# Patient Record
Sex: MALE | Race: WHITE | NOT HISPANIC OR LATINO | Employment: OTHER | URBAN - METROPOLITAN AREA
[De-identification: names, ages, dates, MRNs, and addresses within clinical notes are randomized per-mention and may not be internally consistent; named-entity substitution may affect disease eponyms.]

---

## 2023-04-21 ENCOUNTER — TELEPHONE (OUTPATIENT)
Dept: UROLOGY | Facility: CLINIC | Age: 83
End: 2023-04-21

## 2023-04-21 ENCOUNTER — OFFICE VISIT (OUTPATIENT)
Dept: UROLOGY | Facility: CLINIC | Age: 83
End: 2023-04-21
Payer: MEDICARE

## 2023-04-21 VITALS
HEIGHT: 71 IN | DIASTOLIC BLOOD PRESSURE: 76 MMHG | BODY MASS INDEX: 28.56 KG/M2 | OXYGEN SATURATION: 98 % | HEART RATE: 68 BPM | WEIGHT: 204 LBS | SYSTOLIC BLOOD PRESSURE: 132 MMHG

## 2023-04-21 DIAGNOSIS — R97.21 BIOCHEMICALLY RECURRENT MALIGNANT NEOPLASM OF PROSTATE: ICD-10-CM

## 2023-04-21 DIAGNOSIS — C61 BIOCHEMICALLY RECURRENT MALIGNANT NEOPLASM OF PROSTATE: ICD-10-CM

## 2023-04-21 DIAGNOSIS — C61 PROSTATE CANCER: ICD-10-CM

## 2023-04-21 DIAGNOSIS — C61 RECURRENT PROSTATE CANCER: Primary | ICD-10-CM

## 2023-04-21 NOTE — TELEPHONE ENCOUNTER
I called patient and told him to obtain a repeat PSA prior to his next appointment in a few weeks, at any [facility] lab.  He stated understanding

## 2023-04-21 NOTE — PROGRESS NOTES
Prostate Cancer Office Visit      Patient Name: Phillip Bridges  : 1940   MRN: [id number]     Chief Complaint:  Prostate Cancer.   Chief Complaint   Patient presents with   • Prostate Cancer       Referring Provider: No ref. provider found     History of Present Illness: Phillip Bridges is a 82 y.o. male who presents today for a history of prostate cancer around , he is unsure what his original pathology was.     He was following with Dr Martin a Urologist in [place], [place] who has subsequently passed away. He completed 43 treatments of radiation at that time.  States his PSA remains very low until a few years ago.    Patient has been following with Dr. Lorenzo of [facility] in [facility], there is no records available.  Patient states he was started on Eligard injections and Casodex roughly 6 months ago.  He states he has had severe side effects related to androgen deprivation therapy including dizziness, hot flashes, fatigue, shortness of breath, leg swelling etc.    Patient states he remains on Casodex at this time.  States displeased with his previous urologic care and would like to transition care to [facility].    PSA in 2018 was 0.42.   PSA in  was 2.0  PSA in  was 2.28  PSA in 2022 was 3.90  PSA in Oct 2022 was 5.48.     Digital rectal examination today demonstrates a very flat, tiny prostate likely as result of atrophy after radiation with no obvious nodularity or concerns.    Subjective      Review of System: Review of Systems   Genitourinary: Negative for decreased urine volume, difficulty urinating, dysuria, enuresis, flank pain, frequency, hematuria and urgency.      I have reviewed the ROS documented by my clinical staff, I updated appropriately and I agree. Los Scales MD    Past Medical History: History reviewed. No pertinent past medical history.    Past Surgical History: History reviewed. No pertinent surgical history.    Family History: History  reviewed. No pertinent family history.    Social History:   Social History     Socioeconomic History   • Marital status:    Tobacco Use   • Smoking status: Former     Years: 45.00     Types: Cigarettes   • Smokeless tobacco: Never   Vaping Use   • Vaping Use: Never used   Substance and Sexual Activity   • Alcohol use: Not Currently   • Drug use: Never   • Sexual activity: Not Currently       Medications:   No current outpatient medications on file.    Allergies:   No Known Allergies    IPSS Questionnaire (AUA-7):  Over the past month…    1)  Incomplete Emptying:       How often have you had a sensation of not emptying you had the sensation of not emptying your bladder completely after you finished urinating?  0 - Not at all   2)  Frequency:       How often have you had the urinate again less than two hours after you finished urinating?  1 - Less than 1 time in 5   3)  Intermittency:       How often have you found you stopped and started again several times when you urinated?   0 - Not at all   4) Urgency:      How often have you found it difficult to postpone urination?  0 - Not at all   5) Weak Stream:      How often have you had a weak urinary stream?  1 - Less than 1 time in 5   6) Straining:       How often have you had to push or strain to begin urination?  0 - Not at all   7) Nocturia:      How many times did you most typically get up to urinate from the time you went to bed at night until the time you got up in the morning?  3 - 3 times   Total Score:  5   The International Prostate Symptom Score (IPSS) is used to screen, diagnose, track symptoms of benign prostatic hyperplasia (BPH).   0-7 (Mild Symptoms) 8-19 (Moderate) 20-35 (Severe)   Quality of Life (QoL):  If you were to spend the rest of your life with your urinary condition just the way it is now, how would you feel about that? 0-Delighted   Urine Leakage (Incontinence) 0-No Leakage       Sexual Health Inventory for Men (ALBAN)   Over the past 6  "months:     1. How do you rate your confidence that you could get and keep an erection?  0 - No sexual activity    2. When you had erections with sexual   stimulation, how often were your erections hard enough for penetration (entering your partner)?  0 - No Sexual Activity    3. During sexual intercourse, how often were you able to maintain your erection after you had penetrated (entered) your partner?  0 - Did not attempt intercourse   4. During sexual intercourse, how difficult was it to maintain your erection to completion of intercourse?  0 - Did not attempt intercourse   5. When you attempted sexual intercourse, how often was it satisfactory for you?  0 - Did not attempt intercourse    Total Score: 0   The Sexual Health Inventory for Men further classifies ED severity with the following breakpoints:   1-7 (Severe ED) 8-11 (Moderate ED) 12-16 (Mild to Moderate ED) 17-21 (Mild ED)      Post void residual bladder scan:   0 mL     Objective     Physical Exam:   Vital Signs:   Vitals:    04/21/23 0826   BP: 132/76   Pulse: 68   SpO2: 98%   Weight: 92.5 kg (204 lb)   Height: 180.3 cm (71\")     Body mass index is 28.45 kg/m².     Physical Exam  Vitals and nursing note reviewed.   Constitutional:       Appearance: Normal appearance.   HENT:      Head: Normocephalic and atraumatic.      Nose: Nose normal.      Mouth/Throat:      Mouth: Mucous membranes are moist.      Pharynx: Oropharynx is clear.   Eyes:      Extraocular Movements: Extraocular movements intact.      Conjunctiva/sclera: Conjunctivae normal.      Pupils: Pupils are equal, round, and reactive to light.   Cardiovascular:      Rate and Rhythm: Normal rate and regular rhythm.   Pulmonary:      Effort: Pulmonary effort is normal. No respiratory distress.   Abdominal:      Palpations: Abdomen is soft.      Tenderness: There is no abdominal tenderness. There is no right CVA tenderness or left CVA tenderness.   Genitourinary:     Comments:  Circumcised phallus, " orthotopic meatus, bilaterally descended testicles without masses, or lesions.     PHUONG: Normal rectal tone, no blood, estimated 15 gram prostate which is flat and atrophied without nodularity or firmness.   Musculoskeletal:         General: Normal range of motion.      Cervical back: Normal range of motion and neck supple.   Skin:     General: Skin is warm and dry.      Findings: No lesion or rash.   Neurological:      General: No focal deficit present.      Mental Status: He is alert and oriented to person, place, and time. Mental status is at baseline.   Psychiatric:         Mood and Affect: Mood normal.         Behavior: Behavior normal.         Labs:   No results found for: PSA    No results found for: WBC, HGB, HCT, MCV, PLT    Lab Results   Component Value Date    BUN 18 05/24/2022    CREATININE 0.91 05/24/2022    EGFRIFNONA >60 05/24/2022    EGFRIFAFRI 76 09/30/2022    BCR 20 05/24/2022    K 4.1 05/24/2022    CO2 20 (L) 05/24/2022    CALCIUM 8.8 (L) 05/24/2022       Images:   No Images in the past 120 days found..    Measures:   Tobacco:   Phillip Bridges  reports that he has quit smoking. His smoking use included cigarettes. He has never used smokeless tobacco.    Assessment / Plan      Assessment:   Mr. Bridges is a 82 y.o. male who presented today with recurrent prostate cancer, patient was treated with primary radiation therapy in 2008, no pathology records are available.  His previous urologist has subacutely passed away many years ago.  Patient has been biochemical recurrence and we discussed AUA and NCCN guidelines for work-up.  He remains on Casodex, completed 6 months of Eligard recently.  Has also been on Prolia for bone prevention.  I will asked the patient to complete a PSMA PET scan for staging prior to consideration of neck steps.  We will need him to complete another PSA soon.  We will have him set up with oncology for further multidisciplinary discussion regarding best options, in the absence of  obvious metastatic disease on PET scan his options remain monitoring off ADT or intermittent ADT in light of rapidly rising PSA.      Diagnoses and all orders for this visit:    1. Recurrent prostate cancer (Primary)  -     NM PET/CT Skull Base to Mid Thigh; Future  -     Ambulatory Referral to Oncology    2. Prostate cancer  -     NM PET/CT Skull Base to Mid Thigh; Future  -     Ambulatory Referral to Oncology    3. Biochemically recurrent malignant neoplasm of prostate  -     NM PET/CT Skull Base to Mid Thigh; Future  -     Ambulatory Referral to Oncology  -     PSA Diagnostic; Future           Follow Up:   Return in about 3 weeks (around 5/12/2023).    I spent approximately 45 minutes providing clinical care for this patient; including review of patient's chart and provider documentation, face to face time spent with patient in examination room (obtaining history, performing physical exam, discussing diagnosis and management options), placing orders, and completing patient documentation.     Los Scales MD  [facility]

## 2023-04-21 NOTE — TELEPHONE ENCOUNTER
Patient wanted to remind doctor to prescribe Casodex and call in to [facility] in [place], [place].  Thank you.

## 2023-04-24 DIAGNOSIS — C61 BIOCHEMICALLY RECURRENT CASTRATION-SENSITIVE ADENOCARCINOMA OF PROSTATE: Primary | ICD-10-CM

## 2023-04-24 DIAGNOSIS — Z19.1 BIOCHEMICALLY RECURRENT CASTRATION-SENSITIVE ADENOCARCINOMA OF PROSTATE: Primary | ICD-10-CM

## 2023-04-24 RX ORDER — BICALUTAMIDE 50 MG/1
50 TABLET, FILM COATED ORAL DAILY
Qty: 60 TABLET | Refills: 2 | Status: SHIPPED | OUTPATIENT
Start: 2023-04-24

## 2023-04-24 NOTE — TELEPHONE ENCOUNTER
Patient called again to check on Casodex being sent to his pharmacy. I didn't see it in his chart.

## 2023-05-08 ENCOUNTER — LAB (OUTPATIENT)
Dept: LAB | Facility: HOSPITAL | Age: 83
End: 2023-05-08
Payer: MEDICARE

## 2023-05-08 DIAGNOSIS — R97.21 BIOCHEMICALLY RECURRENT MALIGNANT NEOPLASM OF PROSTATE: ICD-10-CM

## 2023-05-08 DIAGNOSIS — C61 BIOCHEMICALLY RECURRENT MALIGNANT NEOPLASM OF PROSTATE: ICD-10-CM

## 2023-05-08 PROCEDURE — 36415 COLL VENOUS BLD VENIPUNCTURE: CPT

## 2023-05-08 PROCEDURE — 84153 ASSAY OF PSA TOTAL: CPT

## 2023-05-09 LAB — PSA SERPL-MCNC: <0.014 NG/ML (ref 0–4)

## 2023-05-12 ENCOUNTER — TELEPHONE (OUTPATIENT)
Dept: UROLOGY | Facility: CLINIC | Age: 83
End: 2023-05-12
Payer: MEDICARE

## 2023-05-12 ENCOUNTER — HOSPITAL ENCOUNTER (OUTPATIENT)
Dept: PET IMAGING | Facility: HOSPITAL | Age: 83
Discharge: HOME | End: 2023-05-12
Payer: MEDICARE

## 2023-05-12 DIAGNOSIS — C61 BIOCHEMICALLY RECURRENT MALIGNANT NEOPLASM OF PROSTATE: ICD-10-CM

## 2023-05-12 DIAGNOSIS — C61 PROSTATE CANCER: ICD-10-CM

## 2023-05-12 DIAGNOSIS — C61 RECURRENT PROSTATE CANCER: ICD-10-CM

## 2023-05-12 DIAGNOSIS — R97.21 BIOCHEMICALLY RECURRENT MALIGNANT NEOPLASM OF PROSTATE: ICD-10-CM

## 2023-05-12 PROCEDURE — 0 PIFLUFOLASTAT F 18 9 MCI SOLUTION PREFILLED SYRINGE: Performed by: STUDENT IN AN ORGANIZED HEALTH CARE EDUCATION/TRAINING PROGRAM

## 2023-05-12 PROCEDURE — A9595 PIFLUFOLASTAT F 18 9 MCI SOLUTION PREFILLED SYRINGE: HCPCS | Performed by: STUDENT IN AN ORGANIZED HEALTH CARE EDUCATION/TRAINING PROGRAM

## 2023-05-12 PROCEDURE — 78815 PET IMAGE W/CT SKULL-THIGH: CPT

## 2023-05-12 RX ADMIN — PIFLUFOLASTAT F-18 1 DOSE: 80 INJECTION INTRAVENOUS at 13:31

## 2023-05-15 ENCOUNTER — CONSULT (OUTPATIENT)
Dept: ONCOLOGY | Facility: CLINIC | Age: 83
End: 2023-05-15
Payer: MEDICARE

## 2023-05-15 VITALS
HEART RATE: 75 BPM | SYSTOLIC BLOOD PRESSURE: 141 MMHG | HEIGHT: 71 IN | TEMPERATURE: 97.8 F | DIASTOLIC BLOOD PRESSURE: 64 MMHG | WEIGHT: 205.9 LBS | BODY MASS INDEX: 28.82 KG/M2 | OXYGEN SATURATION: 97 %

## 2023-05-15 DIAGNOSIS — C61 PROSTATE CANCER: Primary | ICD-10-CM

## 2023-05-15 PROCEDURE — 1126F AMNT PAIN NOTED NONE PRSNT: CPT | Performed by: INTERNAL MEDICINE

## 2023-05-15 PROCEDURE — 99204 OFFICE O/P NEW MOD 45 MIN: CPT | Performed by: INTERNAL MEDICINE

## 2023-05-15 RX ORDER — LEVOCETIRIZINE DIHYDROCHLORIDE 5 MG/1
TABLET, FILM COATED ORAL
COMMUNITY

## 2023-05-15 RX ORDER — LEVOTHYROXINE SODIUM 75 UG/1
75 CAPSULE ORAL
COMMUNITY

## 2023-05-15 RX ORDER — BUPROPION HYDROCHLORIDE 150 MG/1
TABLET ORAL
COMMUNITY

## 2023-05-15 RX ORDER — TAMSULOSIN HYDROCHLORIDE 0.4 MG/1
CAPSULE ORAL
COMMUNITY

## 2023-05-15 RX ORDER — AMLODIPINE BESYLATE 5 MG/1
TABLET ORAL
COMMUNITY
Start: 2023-02-22

## 2023-05-15 NOTE — PROGRESS NOTES
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 male past medical history of seasonal allergies, hypothyroidism, hypertension, prostate cancer who presents today for evaluation of prostate cancer. The patient is accompanied by their wife who contributes to the history of their care.  Patient was initially diagnosed in  and underwent definitive radiation therapy.  Pathology results not available to me from his initial diagnosis.  He was on observation with normal PSAs until  when his PSA started to slowly creep up over the past 6 years until eventually reaching a range of 5.48 in 2022.  He was started on Eligard as well as Casodex.  Had significant side effects with Eligard in terms of fatigue, hot flashes, swelling, emotional changes.  He was seen by Dr. Scales who ordered a PSA and a PSMA PET/CT.  PSA in May 2023 was <0.014 and his PSMA PET/CT did not have any disease noted.  He is currently doing well with no major toxicities.    Oncology History:    Oncology/Hematology History    No history exists.       Subjective      Review of Systems:     Constitutional: Negative for fevers, chills, or weight loss  Eyes: Negative for blurred vision or discharge         Ear/Nose/Throat: Negative for difficulty swallowing, sore throat, LAD                                                       Respiratory: Negative for cough, SOA, wheezing                                                                                        Cardiovascular: Negative for chest pain or palpitations                                                                  Gastrointestinal: Negative for nausea, vomiting or diarrhea                                                                      Genitourinary: Negative for dysuria or hematuria                                                                                           Musculoskeletal: Negative for any joint pains or muscle aches                                                                        Neurologic: Negative for any weakness, headaches, dizziness                                                                         Hematologic: Negative for any easy bleeding or bruising                                                                                   Psychiatric: Negative for anxiety or depression                             Past Medical History: No past medical history on file.    Past Surgical History: No past surgical history on file.    Family History: No family history on file.    Social History:   Social History     Socioeconomic History   • Marital status:    Tobacco Use   • Smoking status: Former     Years: 45.00     Types: Cigarettes   • Smokeless tobacco: Never   Vaping Use   • Vaping Use: Never used   Substance and Sexual Activity   • Alcohol use: Not Currently   • Drug use: Never   • Sexual activity: Not Currently       Medications:     Current Outpatient Medications:   •  amLODIPine (NORVASC) 5 MG tablet, , Disp: , Rfl:   •  buPROPion XL (WELLBUTRIN XL) 150 MG 24 hr tablet, bupropion HCl  mg 24 hr tablet, extended release, Disp: , Rfl:   •  levocetirizine (XYZAL) 5 MG tablet, levocetirizine 5 mg tablet, Disp: , Rfl:   •  levothyroxine sodium (TIROSINT) 75 MCG capsule, Take 1 capsule by mouth., Disp: , Rfl:   •  Olmesartan Medoxomil (BENICAR PO), Benicar  10 mg, Disp: , Rfl:   •  tamsulosin (FLOMAX) 0.4 MG capsule 24 hr capsule, tamsulosin 0.4 mg capsule, Disp: , Rfl:     Allergies:   No Known Allergies    Objective     Physical Exam:  Vital Signs:   Vitals:    05/15/23 1041   BP: 141/64   Pulse: 75   Temp: 97.8 °F (36.6 °C)   TempSrc: Temporal   SpO2: 97%   Weight: 93.4 kg (205 lb 14.4 oz)   Height:  "180.3 cm (70.98\")   PainSc: 0-No pain     Pain Score    05/15/23 1041   PainSc: 0-No pain     ECOG Performance Status: 0 - Asymptomatic    Constitutional: NAD, ECOG 0  Eyes: PERRLA, scleral anicteric  ENT: No LAD, no thyromegaly  Respiratory: CTAB, no wheezing, rales, rhonchi  Cardiovascular: RRR, no murmurs, pulses 2+ bilaterally  Abdomen: soft, NT/ND, no HSM  Musculoskeletal: strength 5/5 bilaterally, no c/c/e  Neurologic: A&O x 3, CN II-XII intact grossly  Psych: mood and affect congruent, no SI or HI    Results Review:   Lab on 05/08/2023   Component Date Value Ref Range Status   • PSA 05/08/2023 <0.014  0.000 - 4.000 ng/mL Final       NM PET/CT Skull Base to Mid Thigh    Result Date: 5/13/2023  Narrative: NM PET/CT SKULL BASE TO MID THIGH Date of Exam: 5/12/2023 1:20 PM EDT Indication: recurrent prostate cancer after radiation therapy in 2008, PSA now 6+. Comparison: None available. TECHNIQUE: Radiopharmaceutical 9.03 mCi F18 Piflufolastat in right wrist @ 13:31 with patient imaged after appropriate amount of time. CT datasets performed for fusion analysis alone and should not be used for diagnostic purposes as these are low-dose protocol.  COMPARISON: None Findings: Physiologic tracer activity is present in the head and neck, with symmetric lacrimal and salivary gland activity present. No pathologic uptake is present. In the chest, physiologic blood pool activity is noted, without evidence of abnormal uptake in the lungs, soft tissues or osseous structures concerning for metastatic disease. In the abdomen and pelvis, typical exuberant physiologic hepatic, renal cortical and scattered GI uptake is present in addition to typical mild physiologic splenic activity. There is no evidence of abnormal uptake within the osseous structures or soft tissues concerning for metastatic disease. There is no distinct pathologic retroperitoneal adenopathy or pelvic nodularity. The prostate gland demonstrates no definite focal " uptake above low-grade background activity, maximum SUV 1.8.     Impression: Negative Pylarify PET scan without evidence of recurrent or metastatic disease. Electronically Signed: Gold Coreas  5/13/2023 6:06 AM EDT  Workstation ID: [id number]      Assessment / Plan      Assessment/Plan:   1. Prostate cancer (Primary)  -Initially diagnosed in 2008 and status post definitive radiation treatment  -PSA slowly rising from 2007 from 0.35 until October 20 22-5.48  -Castrate sensitive disease and likely low risk disease based off the amount of time for his PSA to increase  -Started on Eligard and Casodex in October 2022 with significant toxicities related to the Eligard  -PSA in May 2022 <0.014 and PSMA PET/CT with no disease noted  -Discussed the results with patient and wife today  -Discussed treatment options including active surveillance vs transitioning to relugolix for androgen deprivation  -Per discussion, we had elected to pursue active surveillance and plan to repeat a PSA in 3 months.  Ordered today  -     PSA DIAGNOSTIC; Future           Follow Up:   Follow-up in 3 months     Stanford Fonseca MD  Hematology and Oncology     Please note that portions of this note may have been completed with a voice recognition program. Efforts were made to edit the dictations, but occasionally words are mistranscribed.

## 2023-05-24 ENCOUNTER — OFFICE VISIT (OUTPATIENT)
Dept: UROLOGY | Facility: CLINIC | Age: 83
End: 2023-05-24
Payer: MEDICARE

## 2023-05-24 VITALS — WEIGHT: 205 LBS | BODY MASS INDEX: 28.7 KG/M2 | HEIGHT: 71 IN

## 2023-05-24 DIAGNOSIS — C61 RECURRENT PROSTATE CANCER: Primary | ICD-10-CM

## 2023-05-24 LAB
BILIRUB BLD-MCNC: NEGATIVE MG/DL
CLARITY, POC: CLEAR
COLOR UR: YELLOW
EXPIRATION DATE: NORMAL
GLUCOSE UR STRIP-MCNC: NEGATIVE MG/DL
KETONES UR QL: NEGATIVE
LEUKOCYTE EST, POC: NEGATIVE
Lab: NORMAL
NITRITE UR-MCNC: NEGATIVE MG/ML
PH UR: 6 [PH] (ref 5–8)
PROT UR STRIP-MCNC: NEGATIVE MG/DL
RBC # UR STRIP: NEGATIVE /UL
SP GR UR: 1.01 (ref 1–1.03)
UROBILINOGEN UR QL: NORMAL

## 2023-05-24 PROCEDURE — 51798 US URINE CAPACITY MEASURE: CPT | Performed by: STUDENT IN AN ORGANIZED HEALTH CARE EDUCATION/TRAINING PROGRAM

## 2023-05-24 PROCEDURE — 99213 OFFICE O/P EST LOW 20 MIN: CPT | Performed by: STUDENT IN AN ORGANIZED HEALTH CARE EDUCATION/TRAINING PROGRAM

## 2023-05-24 PROCEDURE — 81003 URINALYSIS AUTO W/O SCOPE: CPT | Performed by: STUDENT IN AN ORGANIZED HEALTH CARE EDUCATION/TRAINING PROGRAM

## 2023-05-24 PROCEDURE — 1160F RVW MEDS BY RX/DR IN RCRD: CPT | Performed by: STUDENT IN AN ORGANIZED HEALTH CARE EDUCATION/TRAINING PROGRAM

## 2023-05-24 PROCEDURE — 1159F MED LIST DOCD IN RCRD: CPT | Performed by: STUDENT IN AN ORGANIZED HEALTH CARE EDUCATION/TRAINING PROGRAM

## 2023-05-24 RX ORDER — LEVOTHYROXINE SODIUM 0.07 MG/1
TABLET ORAL
COMMUNITY
Start: 2023-05-15

## 2023-05-24 RX ORDER — ESZOPICLONE 1 MG/1
TABLET, FILM COATED ORAL
COMMUNITY
Start: 2023-05-17

## 2023-05-24 RX ORDER — FLUTICASONE PROPIONATE 50 MCG
SPRAY, SUSPENSION (ML) NASAL
COMMUNITY
Start: 2023-05-22

## 2023-05-24 NOTE — PROGRESS NOTES
Follow Up Office Visit      Patient Name: Phillip Bridges  : 1940   MRN: [id number]     Chief Complaint:    Chief Complaint   Patient presents with   • Recurrent prostate cancer     Follow up   • Biochemically recurrent malignant neoplasm of prostate   0.1mL    Referring Provider: No ref. provider found    History of Present Illness: Phillip Bridges is a 82 y.o. male who presents today for follow up of biochemically recurrent prostate cancer.  History of prostate cancer, unclear pathology given no outside records are available, completed 43 treatments of radiation with an outside urologist in [facility] who has since passed away.  Has been managed with Eligard and Casodex for presumed biochemical recurrence.  PSA trend is below.  Last underwent Eligard injection in 2022.  Had been on Casodex as well.     PSA in  was 0.42.   PSA in  was 2.0  PSA in  was 2.28  PSA in 2022 was 3.90  PSA in Oct 2022 was 5.48.     Lab Results   Component Value Date    PSA <0.014 2023       He was originally seen in my clinic in .  Given biochemical recurrence he completed a PSMA PET scan which resulted negative for obvious positive PET lesions.    He was referred to medical oncology Dr. Fonseca who is recommending active surveillance off of androgen deprivation therapy given negative PSMA PET scan findings.    The patient states he has intermittent hot flashes, bilateral peripheral neuropathy, some memory changes after undergoing ADT.  He is hesitant to consider ADT again given the side effects.    Subjective      Review of System: Review of Systems   Constitutional: Negative for activity change, fatigue and unexpected weight change.      I have reviewed the ROS documented by my clinical staff, I have updated appropriately and I agree. Los Scales MD    I have reviewed and the following portions of the patient's history were updated as appropriate: past family history, past medical  history, past social history, past surgical history and problem list.    Medications:     Current Outpatient Medications:   •  amLODIPine (NORVASC) 5 MG tablet, , Disp: , Rfl:   •  buPROPion XL (WELLBUTRIN XL) 150 MG 24 hr tablet, bupropion HCl  mg 24 hr tablet, extended release, Disp: , Rfl:   •  eszopiclone (LUNESTA) 1 MG tablet, , Disp: , Rfl:   •  fluticasone (FLONASE) 50 MCG/ACT nasal spray, , Disp: , Rfl:   •  levocetirizine (XYZAL) 5 MG tablet, levocetirizine 5 mg tablet, Disp: , Rfl:   •  levothyroxine (SYNTHROID, LEVOTHROID) 75 MCG tablet, , Disp: , Rfl:   •  levothyroxine sodium (TIROSINT) 75 MCG capsule, Take 1 capsule by mouth., Disp: , Rfl:   •  Olmesartan Medoxomil (BENICAR PO), Benicar  10 mg, Disp: , Rfl:   •  tamsulosin (FLOMAX) 0.4 MG capsule 24 hr capsule, tamsulosin 0.4 mg capsule, Disp: , Rfl:     Allergies:   No Known Allergies    IPSS Questionnaire (AUA-7):  Over the past month…    1)  Incomplete Emptying:       How often have you had a sensation of not emptying you had the sensation of not emptying your bladder completely after you finished urinating?  2 - Less than half the time   2)  Frequency:       How often have you had the urinate again less than two hours after you finished urinating?  2 - Less than half the time   3)  Intermittency:       How often have you found you stopped and started again several times when you urinated?   2 - Less than half the time   4) Urgency:      How often have you found it difficult to postpone urination?  1 - Less than 1 time in 5   5) Weak Stream:      How often have you had a weak urinary stream?  2 - Less than half the time   6) Straining:       How often have you had to push or strain to begin urination?  2 - Less than half the time   7) Nocturia:      How many times did you most typically get up to urinate from the time you went to bed at night until the time you got up in the morning?  3 - 3 times   Total Score:  14   The International Prostate  "Symptom Score (IPSS) is used to screen, diagnose, track symptoms of benign prostatic hyperplasia (BPH).   0-7 (Mild Symptoms) 8-19 (Moderate) 20-35 (Severe)   Quality of Life (QoL):  If you were to spend the rest of your life with your urinary condition just the way it is now, how would you feel about that? 1-Pleased   Urine Leakage (Incontinence) 0-No Leakage     Sexual Health Inventory for Men (ALBAN)   Over the past 6 months:     1. How do you rate your confidence that you could get and keep an erection?  0 - No sexual activity    2. When you had erections with sexual  stimulation, how often were your erections hard enough for penetration (entering your partner)?  0 - No Sexual Activity    3. During sexual intercourse, how often were you able to maintain your erection after you had penetrated (entered) your partner?  0 - Did not attempt intercourse   4. During sexual intercourse, how difficult was it to maintain your erection to completion of intercourse?  0 - Did not attempt intercourse   5. When you attempted sexual intercourse, how often was it satisfactory for you?  0 - Did not attempt intercourse    Total Score: 0   The Sexual Health Inventory for Men further classifies ED severity with the following breakpoints:   1-7 (Severe ED) 8-11 (Moderate ED) 12-16 (Mild to Moderate ED) 17-21 (Mild ED)           Objective     Physical Exam:   Vital Signs:   Vitals:    05/24/23 1031   Weight: 93 kg (205 lb)   Height: 180.3 cm (70.98\")   PainSc: 0-No pain     Body mass index is 28.6 kg/m².     Physical Exam  Constitutional:       Appearance: Normal appearance.   HENT:      Head: Normocephalic and atraumatic.      Nose: Nose normal.   Eyes:      Extraocular Movements: Extraocular movements intact.      Conjunctiva/sclera: Conjunctivae normal.      Pupils: Pupils are equal, round, and reactive to light.   Musculoskeletal:         General: Normal range of motion.      Cervical back: Normal range of motion and neck supple. "   Skin:     General: Skin is warm and dry.      Findings: No lesion or rash.   Neurological:      General: No focal deficit present.      Mental Status: He is alert and oriented to person, place, and time. Mental status is at baseline.   Psychiatric:         Mood and Affect: Mood normal.         Behavior: Behavior normal.         Labs:   Brief Urine Lab Results  (Last result in the past 365 days)      Color   Clarity   Blood   Leuk Est   Nitrite   Protein   CREAT   Urine HCG        05/24/23 1056 Yellow   Clear   Negative   Negative   Negative   Negative                      Lab Results   Component Value Date    CALCIUM 8.8 (L) 05/24/2022     05/24/2022    K 4.1 05/24/2022    CO2 20 (L) 05/24/2022     05/24/2022    BUN 18 05/24/2022    CREATININE 0.91 05/24/2022    EGFRIFAFRI 76 09/30/2022    EGFRIFNONA >60 05/24/2022    BCR 20 05/24/2022    ANIONGAP 14 05/24/2022       No results found for: WBC, HGB, HCT, MCV, PLT    Images:   NM PET/CT Skull Base to Mid Thigh    Result Date: 5/13/2023  Negative Pylarify PET scan without evidence of recurrent or metastatic disease. Electronically Signed: Gold Coreas  5/13/2023 6:06 AM EDT  Workstation ID: [id number]      Measures:   Tobacco:   Phillip Bridges  reports that he has quit smoking. His smoking use included cigarettes. He has never used smokeless tobacco.              Assessment / Plan      Assessment/Plan:   82 y.o. male who presented today for follow up of biochemically recurrent prostate cancer after remote radiation.  Most recent PSA undetectable after a 6-month Eligard injection and ongoing Casodex which was recently discontinued.  PSMA PET scan is negative for recurrent positive PET lesions and given his age and poor tolerance of ADT Dr. Fonseca has recommended active surveillance and repeating a PSA in 3 months which is reasonable.  Discussed at this point given negative PET scan it would be reasonable to stay off ADT and less significant PSA rise in the  future at which point repeat imaging can be considered or going back on ADT of some sort.  Oral relugolix may be better tolerated by this patient.  I will defer primary treatment to medical oncology and I will see him back in 6 months for symptom check.    Diagnoses and all orders for this visit:    1. Recurrent prostate cancer (Primary)  -     POC Urinalysis Dipstick, Automated           Follow Up:   Return in about 6 months (around 11/24/2023).    I spent approximately 20 minutes providing clinical care for this patient; including review of patient's chart and provider documentation, face to face time spent with patient in examination room (obtaining history, performing physical exam, discussing diagnosis and management options), placing orders, and completing patient documentation.     Los Scales MD  [facility] [place]

## 2023-08-14 ENCOUNTER — TELEPHONE (OUTPATIENT)
Dept: ONCOLOGY | Facility: CLINIC | Age: 83
End: 2023-08-14

## 2023-08-14 ENCOUNTER — LAB (OUTPATIENT)
Dept: LAB | Facility: HOSPITAL | Age: 83
End: 2023-08-14
Payer: MEDICARE

## 2023-08-14 DIAGNOSIS — C61 PROSTATE CANCER: ICD-10-CM

## 2023-08-14 LAB — PSA SERPL-MCNC: <0.014 NG/ML (ref 0–4)

## 2023-08-14 PROCEDURE — 84403 ASSAY OF TOTAL TESTOSTERONE: CPT

## 2023-08-14 PROCEDURE — 84153 ASSAY OF PSA TOTAL: CPT

## 2023-08-14 PROCEDURE — 36415 COLL VENOUS BLD VENIPUNCTURE: CPT

## 2023-08-14 PROCEDURE — 84402 ASSAY OF FREE TESTOSTERONE: CPT

## 2023-08-14 NOTE — TELEPHONE ENCOUNTER
Caller: Phillip Bridges    Relationship to patient: Self    Best call back number: [phone]    Chief complaint: PATIENT CALLED TO RESCHEDULE    Type of visit: FOLLOW UP    If rescheduling, when is the original appointment: 8-14-23

## 2023-08-14 NOTE — TELEPHONE ENCOUNTER
Caller: Phillip Bridges    Relationship to patient: Self    Best call back number: [phone]    Chief complaint: IN TRAFFIC CAN'T MAKE IT     Type of visit: F/U 1     Requested date: CALL TO R/S     If rescheduling, when is the original appointment: 8/14/2023

## 2023-08-15 ENCOUNTER — OFFICE VISIT (OUTPATIENT)
Dept: ONCOLOGY | Facility: CLINIC | Age: 83
End: 2023-08-15
Payer: MEDICARE

## 2023-08-15 VITALS
HEIGHT: 71 IN | OXYGEN SATURATION: 98 % | SYSTOLIC BLOOD PRESSURE: 138 MMHG | HEART RATE: 79 BPM | TEMPERATURE: 97 F | RESPIRATION RATE: 16 BRPM | WEIGHT: 205 LBS | BODY MASS INDEX: 28.7 KG/M2 | DIASTOLIC BLOOD PRESSURE: 64 MMHG

## 2023-08-15 DIAGNOSIS — C61 PROSTATE CANCER: Primary | ICD-10-CM

## 2023-08-15 PROCEDURE — 99214 OFFICE O/P EST MOD 30 MIN: CPT | Performed by: INTERNAL MEDICINE

## 2023-08-15 PROCEDURE — 1126F AMNT PAIN NOTED NONE PRSNT: CPT | Performed by: INTERNAL MEDICINE

## 2023-08-15 RX ORDER — ERYTHROMYCIN 5 MG/G
1 OINTMENT OPHTHALMIC NIGHTLY
COMMUNITY
Start: 2023-07-18

## 2023-08-15 RX ORDER — FLUTICASONE PROPIONATE 44 MCG
1 AEROSOL WITH ADAPTER (GRAM) INHALATION
COMMUNITY
Start: 2023-07-11

## 2023-08-15 RX ORDER — ALLOPURINOL 300 MG/1
300 TABLET ORAL DAILY
COMMUNITY
Start: 2023-06-12

## 2023-08-15 NOTE — PROGRESS NOTES
Follow Up Office Visit      Date: 08/15/2023     Patient Name: Phillpi Bridges  MRN: [id number]  : 1940  Referring Physician: Los Scales     Chief Complaint: Follow-up for castrate sensitive prostate cancer     History of Present Illness: Phillip Bridges is a pleasant 82 y.o. male past medical history of seasonal allergies, hypothyroidism, hypertension, prostate cancer who presents today for evaluation of prostate cancer. The patient is accompanied by their wife who contributes to the history of their care.  Patient was initially diagnosed in  and underwent definitive radiation therapy.  Pathology results not available to me from his initial diagnosis.  He was on observation with normal PSAs until  when his PSA started to slowly creep up over the past 6 years until eventually reaching a range of 5.48 in 2022.  He was started on Eligard as well as Casodex.  Had significant side effects with Eligard in terms of fatigue, hot flashes, swelling, emotional changes.  He was seen by Dr. Scales who ordered a PSA and a PSMA PET/CT.  PSA in May 2023 was <0.014 and his PSMA PET/CT did not have any disease noted.  He is currently doing well with no major toxicities.    Interval History:  Presents clinic for follow-up.  Overall stable at this time.  Continues to have fatigue and some weakness which affects his quality of life.  Notes some mild hot flashes but overall stable.  Has some tingling in his lower extremities bilaterally but has been never diagnosed with neuropathy.  Feels like his symptoms are still related to his Eligard injection back in October    Oncology History:    Oncology/Hematology History    No history exists.       Subjective      Review of Systems:   Constitutional: Negative for fevers, chills, or weight loss  Eyes: Negative for blurred vision or discharge         Ear/Nose/Throat: Negative for difficulty swallowing, sore throat, LAD                                                        Respiratory: Negative for cough, SOA, wheezing                                                                                        Cardiovascular: Negative for chest pain or palpitations                                                                  Gastrointestinal: Negative for nausea, vomiting or diarrhea                                                                     Genitourinary: Negative for dysuria or hematuria                                                                                           Musculoskeletal: Negative for any joint pains or muscle aches                                                                        Neurologic: Negative for any weakness, headaches, dizziness                                                                         Hematologic: Negative for any easy bleeding or bruising                                                                                   Psychiatric: Negative for anxiety or depression                          Past Medical History/Past Surgical History/ Family History/ Social History: Reviewed by me and unchanged from my previous documentation done on May 2023.     Medications:     Current Outpatient Medications:     allopurinol (ZYLOPRIM) 300 MG tablet, Take 1 tablet by mouth Daily., Disp: , Rfl:     amLODIPine (NORVASC) 5 MG tablet, , Disp: , Rfl:     buPROPion XL (WELLBUTRIN XL) 150 MG 24 hr tablet, bupropion HCl  mg 24 hr tablet, extended release, Disp: , Rfl:     erythromycin (ROMYCIN) 5 MG/GM ophthalmic ointment, Administer 1 application  to both eyes Every Night., Disp: , Rfl:     eszopiclone (LUNESTA) 1 MG tablet, As Needed., Disp: , Rfl:     Flovent HFA 44 MCG/ACT inhaler, Inhale 1 puff 2 (Two) Times a Day., Disp: , Rfl:     fluticasone (FLONASE) 50 MCG/ACT nasal spray, 1 spray into the nostril(s) as directed by provider As Needed., Disp: , Rfl:     levothyroxine (SYNTHROID, LEVOTHROID) 75 MCG tablet, , Disp: , Rfl:      "Olmesartan Medoxomil (BENICAR PO), Take 20 mg by mouth Daily., Disp: , Rfl:     tamsulosin (FLOMAX) 0.4 MG capsule 24 hr capsule, tamsulosin 0.4 mg capsule, Disp: , Rfl:     Allergies:   No Known Allergies    Objective     Physical Exam:  Vital Signs:   Vitals:    08/15/23 1003   BP: 138/64   Pulse: 79   Resp: 16   Temp: 97 øF (36.1 øC)   SpO2: 98%   Weight: 93 kg (205 lb)   Height: 180.3 cm (71\")   PainSc: 0-No pain     Pain Score    08/15/23 1003   PainSc: 0-No pain     ECOG Performance Status: 0 - Asymptomatic    Constitutional: NAD, ECOG 0  Eyes: PERRLA, scleral anicteric  ENT: No LAD, no thyromegaly  Respiratory: CTAB, no wheezing, rales, rhonchi  Cardiovascular: RRR, no murmurs, pulses 2+ bilaterally  Abdomen: soft, NT/ND, no HSM  Musculoskeletal: strength 5/5 bilaterally, no c/c/e  Neurologic: A&O x 3, CN II-XII intact grossly    Results Review:   Lab on 08/14/2023   Component Date Value Ref Range Status    PSA 08/14/2023 <0.014  0.000 - 4.000 ng/mL Final       No results found.    Assessment / Plan      Assessment/Plan:   1. Prostate cancer (Primary)  -Initially diagnosed in 2008 and status post definitive radiation treatment  -PSA slowly rising from 2007 from 0.35 until October 20 22-5.48  -Castrate sensitive disease and likely low risk disease based off the amount of time for his PSA to increase  -Started on Eligard and Casodex in October 2022 with significant toxicities related to the Eligard  -PSA in May 2023 <0.014 and PSMA PET/CT with no disease noted  -PSA in August 2023 < 0.014  -We will continue with active surveillance at this time with a repeat PSA in 3 months  -Should he require treatment, will consider relugolix rather than Eligard    2.  Pituitary macroadenoma  -Follows with neurosurgery at   -Most recent MRI in June 2023 showing stable disease with plans to repeat in 1 year    Follow Up:   Follow-up in 3 months       Stanford Fonseca MD  Hematology and Oncology     Please note that " portions of this note may have been completed with a voice recognition program. Efforts were made to edit the dictations, but occasionally words are mistranscribed.

## 2023-08-20 LAB
TESTOST FREE SERPL-MCNC: 0.4 PG/ML (ref 6.6–18.1)
TESTOST SERPL-MCNC: <3 NG/DL (ref 264–916)

## 2023-11-12 ENCOUNTER — LAB (OUTPATIENT)
Dept: LAB | Facility: HOSPITAL | Age: 83
End: 2023-11-12
Payer: MEDICARE

## 2023-11-12 DIAGNOSIS — C61 PROSTATE CANCER: ICD-10-CM

## 2023-11-12 LAB — PSA SERPL-MCNC: <0.014 NG/ML (ref 0–4)

## 2023-11-12 PROCEDURE — 84153 ASSAY OF PSA TOTAL: CPT

## 2023-11-12 PROCEDURE — 36415 COLL VENOUS BLD VENIPUNCTURE: CPT

## 2023-11-21 ENCOUNTER — OFFICE VISIT (OUTPATIENT)
Dept: ONCOLOGY | Facility: CLINIC | Age: 83
End: 2023-11-21
Payer: MEDICARE

## 2023-11-21 VITALS
DIASTOLIC BLOOD PRESSURE: 70 MMHG | TEMPERATURE: 97.4 F | HEIGHT: 71 IN | WEIGHT: 205 LBS | SYSTOLIC BLOOD PRESSURE: 168 MMHG | OXYGEN SATURATION: 96 % | BODY MASS INDEX: 28.7 KG/M2 | RESPIRATION RATE: 18 BRPM | HEART RATE: 69 BPM

## 2023-11-21 DIAGNOSIS — C61 PROSTATE CANCER: Primary | ICD-10-CM

## 2023-11-21 PROCEDURE — 99214 OFFICE O/P EST MOD 30 MIN: CPT | Performed by: INTERNAL MEDICINE

## 2023-11-21 PROCEDURE — 1126F AMNT PAIN NOTED NONE PRSNT: CPT | Performed by: INTERNAL MEDICINE

## 2023-11-21 RX ORDER — CARVEDILOL 6.25 MG/1
6.25 TABLET ORAL 2 TIMES DAILY WITH MEALS
COMMUNITY
Start: 2023-10-04

## 2023-11-21 NOTE — PROGRESS NOTES
Follow Up Office Visit      Date: 2023     Patient Name: Phillip Bridges  MRN: [id number]  : 1940  Referring Physician: Los Scales     Chief Complaint: Follow-up for castrate sensitive prostate cancer     History of Present Illness: Phillip Bridges is a pleasant 82 y.o. male past medical history of seasonal allergies, hypothyroidism, hypertension, prostate cancer who presents today for evaluation of prostate cancer. The patient is accompanied by their wife who contributes to the history of their care.  Patient was initially diagnosed in  and underwent definitive radiation therapy.  Pathology results not available to me from his initial diagnosis.  He was on observation with normal PSAs until  when his PSA started to slowly creep up over the past 6 years until eventually reaching a range of 5.48 in 2022.  He was started on Eligard as well as Casodex.  Had significant side effects with Eligard in terms of fatigue, hot flashes, swelling, emotional changes.  He was seen by Dr. Scales who ordered a PSA and a PSMA PET/CT.  PSA in May 2023 was <0.014 and his PSMA PET/CT did not have any disease noted.  He is currently doing well with no major toxicities.     Interval History:  Presents clinic for follow-up.  No major issues today.  Denies any worsening fatigue or tiredness.  Following with cardiology at [facility] and recent had a heart cath which only revealed 1 vessel disease.  Not requiring any stents or other surgical intervention    Oncology History:    Oncology/Hematology History    No history exists.       Subjective      Review of Systems:   Constitutional: Negative for fevers, chills, or weight loss  Eyes: Negative for blurred vision or discharge         Ear/Nose/Throat: Negative for difficulty swallowing, sore throat, LAD                                                       Respiratory: Negative for cough, SOA, wheezing                                                                                         Cardiovascular: Negative for chest pain or palpitations                                                                  Gastrointestinal: Negative for nausea, vomiting or diarrhea                                                                     Genitourinary: Negative for dysuria or hematuria                                                                                           Musculoskeletal: Negative for any joint pains or muscle aches                                                                        Neurologic: Negative for any weakness, headaches, dizziness                                                                         Hematologic: Negative for any easy bleeding or bruising                                                                                   Psychiatric: Negative for anxiety or depression                          Past Medical History/Past Surgical History/ Family History/ Social History: Reviewed by me and unchanged from my previous documentation done on August 2023.     Medications:     Current Outpatient Medications:     allopurinol (ZYLOPRIM) 300 MG tablet, Take 1 tablet by mouth Daily., Disp: , Rfl:     amLODIPine (NORVASC) 5 MG tablet, , Disp: , Rfl:     buPROPion XL (WELLBUTRIN XL) 150 MG 24 hr tablet, bupropion HCl  mg 24 hr tablet, extended release, Disp: , Rfl:     carvedilol (COREG) 6.25 MG tablet, Take 1 tablet by mouth 2 (Two) Times a Day With Meals., Disp: , Rfl:     erythromycin (ROMYCIN) 5 MG/GM ophthalmic ointment, Administer 1 application  to both eyes Every Night., Disp: , Rfl:     eszopiclone (LUNESTA) 1 MG tablet, As Needed., Disp: , Rfl:     Flovent HFA 44 MCG/ACT inhaler, Inhale 1 puff 2 (Two) Times a Day., Disp: , Rfl:     fluticasone (FLONASE) 50 MCG/ACT nasal spray, 1 spray into the nostril(s) as directed by provider As Needed., Disp: , Rfl:     levothyroxine (SYNTHROID, LEVOTHROID) 75 MCG tablet, , Disp: , Rfl:      "Olmesartan Medoxomil (BENICAR PO), Take 20 mg by mouth Daily., Disp: , Rfl:     tamsulosin (FLOMAX) 0.4 MG capsule 24 hr capsule, tamsulosin 0.4 mg capsule, Disp: , Rfl:     Allergies:   No Known Allergies    Objective     Physical Exam:  Vital Signs:   Vitals:    11/21/23 0904   BP: 168/70   Pulse: 69   Resp: 18   Temp: 97.4 °F (36.3 °C)   SpO2: 96%   Weight: 93 kg (205 lb)   Height: 180.3 cm (71\")   PainSc: 0-No pain     Pain Score    11/21/23 0904   PainSc: 0-No pain     ECOG Performance Status: 0 - Asymptomatic    Constitutional: NAD, ECOG 0  Eyes: PERRLA, scleral anicteric  ENT: No LAD, no thyromegaly  Respiratory: CTAB, no wheezing, rales, rhonchi  Cardiovascular: RRR, no murmurs, pulses 2+ bilaterally  Abdomen: soft, NT/ND, no HSM  Musculoskeletal: strength 5/5 bilaterally, no c/c/e  Neurologic: A&O x 3, CN II-XII intact grossly    Results Review:   Lab on 11/12/2023   Component Date Value Ref Range Status    PSA 11/12/2023 <0.014  0.000 - 4.000 ng/mL Final       No results found.    Assessment / Plan      Assessment/Plan:   1. Prostate cancer (Primary)  -Initially diagnosed in 2008 and status post definitive radiation treatment  -PSA slowly rising from 2007 from 0.35 until October 20 22-5.48  -Castrate sensitive disease and likely low risk disease based off the amount of time for his PSA to increase  -Started on Eligard and Casodex in October 2022 with significant toxicities related to the Eligard  -PSA in May 2023 <0.014 and PSMA PET/CT with no disease noted  -PSA in August 2023 < 0.014  -PSA in November 2023 <0.014  -We will continue with active surveillance at this time with a repeat PSA in 3 months  -Should he require treatment, will consider relugolix rather than Eligard     2.  Pituitary macroadenoma  -Follows with neurosurgery at   -Most recent MRI in June 2023 showing stable disease with plans to repeat in 1 year         Follow Up:   Follow-up in 3 months     Stanford Fonseca MD  Hematology and " Oncology     Please note that portions of this note may have been completed with a voice recognition program. Efforts were made to edit the dictations, but occasionally words are mistranscribed.

## 2023-11-27 ENCOUNTER — OFFICE VISIT (OUTPATIENT)
Dept: UROLOGY | Facility: CLINIC | Age: 83
End: 2023-11-27
Payer: MEDICARE

## 2023-11-27 VITALS
SYSTOLIC BLOOD PRESSURE: 162 MMHG | HEART RATE: 67 BPM | WEIGHT: 205 LBS | DIASTOLIC BLOOD PRESSURE: 72 MMHG | HEIGHT: 71 IN | OXYGEN SATURATION: 98 % | BODY MASS INDEX: 28.7 KG/M2

## 2023-11-27 DIAGNOSIS — R39.9 LOWER URINARY TRACT SYMPTOMS (LUTS): ICD-10-CM

## 2023-11-27 DIAGNOSIS — C61 PROSTATE CANCER: Primary | ICD-10-CM

## 2023-11-27 LAB
BILIRUB BLD-MCNC: ABNORMAL MG/DL
CLARITY, POC: CLEAR
COLOR UR: YELLOW
EXPIRATION DATE: ABNORMAL
GLUCOSE UR STRIP-MCNC: NEGATIVE MG/DL
KETONES UR QL: NEGATIVE
LEUKOCYTE EST, POC: NEGATIVE
Lab: ABNORMAL
NITRITE UR-MCNC: NEGATIVE MG/ML
PH UR: 6 [PH] (ref 5–8)
PROT UR STRIP-MCNC: ABNORMAL MG/DL
RBC # UR STRIP: NEGATIVE /UL
SP GR UR: 1.02 (ref 1–1.03)
UROBILINOGEN UR QL: NORMAL

## 2023-11-27 PROCEDURE — 81003 URINALYSIS AUTO W/O SCOPE: CPT | Performed by: STUDENT IN AN ORGANIZED HEALTH CARE EDUCATION/TRAINING PROGRAM

## 2023-11-27 PROCEDURE — 99214 OFFICE O/P EST MOD 30 MIN: CPT | Performed by: STUDENT IN AN ORGANIZED HEALTH CARE EDUCATION/TRAINING PROGRAM

## 2023-11-27 PROCEDURE — 1159F MED LIST DOCD IN RCRD: CPT | Performed by: STUDENT IN AN ORGANIZED HEALTH CARE EDUCATION/TRAINING PROGRAM

## 2023-11-27 PROCEDURE — 1160F RVW MEDS BY RX/DR IN RCRD: CPT | Performed by: STUDENT IN AN ORGANIZED HEALTH CARE EDUCATION/TRAINING PROGRAM

## 2023-11-27 PROCEDURE — 51798 US URINE CAPACITY MEASURE: CPT | Performed by: STUDENT IN AN ORGANIZED HEALTH CARE EDUCATION/TRAINING PROGRAM

## 2023-11-27 RX ORDER — TAMSULOSIN HYDROCHLORIDE 0.4 MG/1
1 CAPSULE ORAL NIGHTLY
Qty: 90 CAPSULE | Refills: 1 | Status: SHIPPED | OUTPATIENT
Start: 2023-11-27

## 2023-11-27 NOTE — PROGRESS NOTES
Prostate Cancer Office Visit      Patient Name: Phillip Bridges  : 1940   MRN: [id number]     Chief Complaint:  Prostate Cancer.   Chief Complaint   Patient presents with    Recurrent prostate cancer       Referring Provider: No ref. provider found    History of Present Illness: Phillip Bridges is a 83 y.o. male who presents today with history of prostate cancer.  Patient originally seen in 2023.  History of IMRT radiation therapy for prostate cancer in the early .  Transition care to [facility].  Patient's PSA had risen over the last 4 years.    PSA in 2018 was 0.42.   PSA in  was 2.0  PSA in  was 2.28  PSA in 2022 was 3.90  PSA in Oct 2022 was 5.48    Patient was on Eligard and Casodex for presumed biochemical recurrence.  Underwent PSMA PET scan which was negative for obvious metastatic disease.  He is following with medical oncology, most recent PSA have been undetectable over the last 6 months.  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    Lab Results   Component Value Date    PSA <0.014 2023    PSA <0.014 2023    PSA <0.014 2023     PSA undetectable.     Reports some mild weak stream, has previously been on tamsulosin but discontinued this.  He is interested in potentially restarting tamsulosin.  IPSS 9, please quality of life.  PVR reassuring.  Emptying his bladder well.    Subjective      Review of System: Review of Systems   Constitutional:  Positive for fatigue.   HENT: Negative.     Eyes: Negative.    Respiratory: Negative.     Cardiovascular: Negative.    Gastrointestinal: Negative.    Endocrine: Negative.    Genitourinary: Negative.    Musculoskeletal: Negative.    Skin: Negative.    Allergic/Immunologic: Negative.    Neurological:  Positive for dizziness and light-headedness.   Hematological: Negative.    Psychiatric/Behavioral: Negative.        I have reviewed the ROS documented by my clinical  staff, I updated appropriately and I agree. Los Scales MD    Past Medical History: History reviewed. No pertinent past medical history.    Past Surgical History: History reviewed. No pertinent surgical history.    Family History: History reviewed. No pertinent family history.    Social History:   Social History     Socioeconomic History    Marital status:    Tobacco Use    Smoking status: Former     Years: 45     Types: Cigarettes     Passive exposure: Past    Smokeless tobacco: Never   Vaping Use    Vaping Use: Never used   Substance and Sexual Activity    Alcohol use: Not Currently    Drug use: Never    Sexual activity: Not Currently       Medications:     Current Outpatient Medications:     allopurinol (ZYLOPRIM) 300 MG tablet, Take 1 tablet by mouth Daily., Disp: , Rfl:     amLODIPine (NORVASC) 5 MG tablet, , Disp: , Rfl:     eszopiclone (LUNESTA) 1 MG tablet, As Needed., Disp: , Rfl:     Flovent HFA 44 MCG/ACT inhaler, Inhale 1 puff 2 (Two) Times a Day., Disp: , Rfl:     fluticasone (FLONASE) 50 MCG/ACT nasal spray, 1 spray into the nostril(s) as directed by provider As Needed., Disp: , Rfl:     levothyroxine (SYNTHROID, LEVOTHROID) 75 MCG tablet, , Disp: , Rfl:     Olmesartan Medoxomil (BENICAR PO), Take 20 mg by mouth Daily., Disp: , Rfl:     tamsulosin (FLOMAX) 0.4 MG capsule 24 hr capsule, Take 1 capsule by mouth Every Night., Disp: 90 capsule, Rfl: 1    buPROPion XL (WELLBUTRIN XL) 150 MG 24 hr tablet, bupropion HCl  mg 24 hr tablet, extended release (Patient not taking: Reported on 11/27/2023), Disp: , Rfl:     carvedilol (COREG) 6.25 MG tablet, Take 1 tablet by mouth 2 (Two) Times a Day With Meals. (Patient not taking: Reported on 11/27/2023), Disp: , Rfl:     erythromycin (ROMYCIN) 5 MG/GM ophthalmic ointment, Administer 1 application  to both eyes Every Night. (Patient not taking: Reported on 11/27/2023), Disp: , Rfl:     Allergies:   No Known Allergies    IPSS Questionnaire  "(AUA-7):  Over the past month…    1)  Incomplete Emptying:       How often have you had a sensation of not emptying you had the sensation of not emptying your bladder completely after you finished urinating?  1 - Less than 1 time in 5   2)  Frequency:       How often have you had the urinate again less than two hours after you finished urinating?  1 - Less than 1 time in 5   3)  Intermittency:       How often have you found you stopped and started again several times when you urinated?   2 - Less than half the time   4) Urgency:      How often have you found it difficult to postpone urination?  3 - About half the time   5) Weak Stream:      How often have you had a weak urinary stream?  1 - Less than 1 time in 5   6) Straining:       How often have you had to push or strain to begin urination?  1 - Less than 1 time in 5   7) Nocturia:      How many times did you most typically get up to urinate from the time you went to bed at night until the time you got up in the morning?  3 - 3 times   Total Score:  9   The International Prostate Symptom Score (IPSS) is used to screen, diagnose, track symptoms of benign prostatic hyperplasia (BPH).   0-7 (Mild Symptoms) 8-19 (Moderate) 20-35 (Severe)   Quality of Life (QoL):  If you were to spend the rest of your life with your urinary condition just the way it is now, how would you feel about that? 1-Pleased   Urine Leakage (Incontinence) 0-No Leakage        Post void residual bladder scan:   10 mL     Objective     Physical Exam:   Vital Signs:   Vitals:    11/27/23 1116   BP: 162/72   Pulse: 67   SpO2: 98%   Weight: 93 kg (205 lb)   Height: 180.3 cm (70.98\")     Body mass index is 28.6 kg/m².     Physical Exam  Constitutional:       Appearance: Normal appearance.   HENT:      Head: Normocephalic and atraumatic.      Nose: Nose normal.   Eyes:      Extraocular Movements: Extraocular movements intact.      Conjunctiva/sclera: Conjunctivae normal.      Pupils: Pupils are equal, " "round, and reactive to light.   Musculoskeletal:         General: Normal range of motion.      Cervical back: Normal range of motion and neck supple.   Skin:     General: Skin is warm and dry.      Findings: No lesion or rash.   Neurological:      General: No focal deficit present.      Mental Status: He is alert and oriented to person, place, and time. Mental status is at baseline.   Psychiatric:         Mood and Affect: Mood normal.         Behavior: Behavior normal.         Labs:   Lab Results   Component Value Date    PSA <0.014 11/12/2023    PSA <0.014 08/14/2023    PSA <0.014 05/08/2023       No results found for: \"WBC\", \"HGB\", \"HCT\", \"MCV\", \"PLT\"    Lab Results   Component Value Date    BUN 18 05/24/2022    CREATININE 0.91 05/24/2022    EGFRIFNONA >60 05/24/2022    EGFRIFAFRI 50 06/30/2023    BCR 20 05/24/2022    K 4.1 05/24/2022    CO2 20 (L) 05/24/2022    CALCIUM 8.8 (L) 05/24/2022       Images:   No Images in the past 120 days found..    Measures:   Tobacco:   Phillip Bridges  reports that he has quit smoking. His smoking use included cigarettes. He has been exposed to tobacco smoke. He has never used smokeless tobacco.     Assessment / Plan      Assessment:   Mr. Bridges is a 83 y.o. male who presented today with history of presumed biochemically recurrent prostate cancer, has been off ADT for a while.  His PSA has been undetectable for the last 6 months.  He had primary IMRT radiation therapy in the early 2000's.  Given his intolerance of ADT, we will keep him off the ADT and continue monitoring PSA.  He will continue to follow with Stanford Fonseca of medical oncology for PSA checks.  I will see him in 6 months for PSA and urinary symptom check.  PSA is reassuringly undetectable.  We will restart tamsulosin nightly given his ongoing mild lower urinary tract symptoms.  If this contributes to hypotension I recommend he discontinue the medication.    Diagnoses and all orders for this visit:    1. Prostate cancer " (Primary)  -     POC Urinalysis Dipstick, Automated  -     PSA Diagnostic; Future    2. Lower urinary tract symptoms (LUTS)  -     tamsulosin (FLOMAX) 0.4 MG capsule 24 hr capsule; Take 1 capsule by mouth Every Night.  Dispense: 90 capsule; Refill: 1           Follow Up:   Return in about 6 months (around 5/27/2024) for Follow Up after Labs.    I spent approximately 20 minutes providing clinical care for this patient; including review of patient's chart and provider documentation, face to face time spent with patient in examination room (obtaining history, performing physical exam, discussing diagnosis and management options), placing orders, and completing patient documentation.     Los Scales MD  [facility] [place]

## 2024-02-15 ENCOUNTER — LAB (OUTPATIENT)
Dept: LAB | Facility: HOSPITAL | Age: 84
End: 2024-02-15
Payer: MEDICARE

## 2024-02-15 DIAGNOSIS — C61 PROSTATE CANCER: ICD-10-CM

## 2024-02-15 LAB — PSA SERPL-MCNC: <0.014 NG/ML (ref 0–4)

## 2024-02-15 PROCEDURE — 36415 COLL VENOUS BLD VENIPUNCTURE: CPT

## 2024-02-15 PROCEDURE — 84153 ASSAY OF PSA TOTAL: CPT

## 2024-02-20 ENCOUNTER — OFFICE VISIT (OUTPATIENT)
Dept: ONCOLOGY | Facility: CLINIC | Age: 84
End: 2024-02-20
Payer: MEDICARE

## 2024-02-20 VITALS
HEART RATE: 88 BPM | WEIGHT: 205 LBS | HEIGHT: 71 IN | SYSTOLIC BLOOD PRESSURE: 159 MMHG | BODY MASS INDEX: 28.7 KG/M2 | RESPIRATION RATE: 18 BRPM | TEMPERATURE: 98 F | DIASTOLIC BLOOD PRESSURE: 73 MMHG | OXYGEN SATURATION: 96 %

## 2024-02-20 DIAGNOSIS — C61 PROSTATE CANCER: Primary | ICD-10-CM

## 2024-02-20 PROCEDURE — 1126F AMNT PAIN NOTED NONE PRSNT: CPT | Performed by: INTERNAL MEDICINE

## 2024-02-20 PROCEDURE — 99214 OFFICE O/P EST MOD 30 MIN: CPT | Performed by: INTERNAL MEDICINE

## 2024-02-20 RX ORDER — NEOMYCIN SULFATE, POLYMYXIN B SULFATE, AND DEXAMETHASONE 3.5; 10000; 1 MG/G; [USP'U]/G; MG/G
1 OINTMENT OPHTHALMIC EVERY 8 HOURS SCHEDULED
COMMUNITY
Start: 2024-02-02

## 2024-02-20 RX ORDER — KETOCONAZOLE 20 MG/G
1 CREAM TOPICAL DAILY
COMMUNITY
Start: 2024-01-18

## 2024-02-20 RX ORDER — CLOBETASOL PROPIONATE 0.5 MG/ML
1 LOTION TOPICAL 2 TIMES DAILY
COMMUNITY
Start: 2023-11-15

## 2024-02-20 NOTE — PROGRESS NOTES
Follow Up Office Visit      Date: 2024     Patient Name: Phillip Bridges  MRN: [id number]  : 1940  Referring Physician: Los Scales     Chief Complaint: Follow-up for castrate sensitive prostate cancer     History of Present Illness: Phillip Bridges is a pleasant 82 y.o. male past medical history of seasonal allergies, hypothyroidism, hypertension, prostate cancer who presents today for evaluation of prostate cancer. The patient is accompanied by their wife who contributes to the history of their care.  Patient was initially diagnosed in  and underwent definitive radiation therapy.  Pathology results not available to me from his initial diagnosis.  He was on observation with normal PSAs until  when his PSA started to slowly creep up over the past 6 years until eventually reaching a range of 5.48 in 2022.  He was started on Eligard as well as Casodex.  Had significant side effects with Eligard in terms of fatigue, hot flashes, swelling, emotional changes.  He was seen by Dr. Scales who ordered a PSA and a PSMA PET/CT.  PSA in May 2023 was <0.014 and his PSMA PET/CT did not have any disease noted.  He is currently doing well with no major toxicities.     Interval History:  Presents clinic for follow-up.  Continues to do well.  Denies any issues with urination.  Denies any abdominal pain or discomfort.  Denies any bone pain or discomfort    Oncology History:    Oncology/Hematology History    No history exists.       Subjective      Review of Systems:   Constitutional: Negative for fevers, chills, or weight loss  Eyes: Negative for blurred vision or discharge         Ear/Nose/Throat: Negative for difficulty swallowing, sore throat, LAD                                                       Respiratory: Negative for cough, SOA, wheezing                                                                                        Cardiovascular: Negative for chest pain or palpitations                                                                   Gastrointestinal: Negative for nausea, vomiting or diarrhea                                                                     Genitourinary: Negative for dysuria or hematuria                                                                                           Musculoskeletal: Negative for any joint pains or muscle aches                                                                        Neurologic: Negative for any weakness, headaches, dizziness                                                                         Hematologic: Negative for any easy bleeding or bruising                                                                                   Psychiatric: Negative for anxiety or depression                          Past Medical History/Past Surgical History/ Family History/ Social History: Reviewed by me and unchanged from my previous documentation done on November 2023.     Medications:     Current Outpatient Medications:     clobetasol (CLOBEX) 0.05 % lotion, Apply 1 Application topically to the appropriate area as directed 2 (Two) Times a Day., Disp: , Rfl:     erythromycin (ROMYCIN) 5 MG/GM ophthalmic ointment, Administer 1 Application to both eyes Every Night., Disp: , Rfl:     eszopiclone (LUNESTA) 1 MG tablet, As Needed., Disp: , Rfl:     Flovent HFA 44 MCG/ACT inhaler, Inhale 1 puff 2 (Two) Times a Day., Disp: , Rfl:     fluticasone (FLONASE) 50 MCG/ACT nasal spray, 1 spray into the nostril(s) as directed by provider As Needed., Disp: , Rfl:     ketoconazole (NIZORAL) 2 % cream, Apply 1 Application topically to the appropriate area as directed Daily., Disp: , Rfl:     levothyroxine (SYNTHROID, LEVOTHROID) 75 MCG tablet, , Disp: , Rfl:     neomycin-polymyxin-dexamethamethasone (POLYDEX) 3.5-57091-9.1 ointment ophthalmic ointment, Administer 1 Application to both eyes Every 8 (Eight) Hours., Disp: , Rfl:     Olmesartan Medoxomil (BENICAR  "PO), Take 20 mg by mouth Daily., Disp: , Rfl:     tamsulosin (FLOMAX) 0.4 MG capsule 24 hr capsule, Take 1 capsule by mouth Every Night., Disp: 90 capsule, Rfl: 1    Allergies:   No Known Allergies    Objective     Physical Exam:  Vital Signs:   Vitals:    02/20/24 1008   BP: 159/73   Pulse: 88   Resp: 18   Temp: 98 °F (36.7 °C)   SpO2: 96%   Weight: 93 kg (205 lb)   Height: 180.3 cm (71\")   PainSc: 0-No pain     Pain Score    02/20/24 1008   PainSc: 0-No pain     ECOG Performance Status: 0 - Asymptomatic    Constitutional: NAD, ECOG 0  Eyes: PERRLA, scleral anicteric  ENT: No LAD, no thyromegaly  Respiratory: CTAB, no wheezing, rales, rhonchi  Cardiovascular: RRR, no murmurs, pulses 2+ bilaterally  Abdomen: soft, NT/ND, no HSM  Musculoskeletal: strength 5/5 bilaterally, no c/c/e  Neurologic: A&O x 3, CN II-XII intact grossly    Results Review:   Lab on 02/15/2024   Component Date Value Ref Range Status    PSA 02/15/2024 <0.014  0.000 - 4.000 ng/mL Final       No results found.    Assessment / Plan      Assessment/Plan:   1. Prostate cancer (Primary)  -Initially diagnosed in 2008 and status post definitive radiation treatment  -PSA slowly rising from 2007 from 0.35 until October 20 22-5.48  -Castrate sensitive disease and likely low risk disease based off the amount of time for his PSA to increase  -Started on Eligard and Casodex in October 2022 with significant toxicities related to the Eligard  -PSA in May 2023 <0.014 and PSMA PET/CT with no disease noted  -PSA in August 2023 < 0.014  -PSA in November 2023 <0.014  -PSA in February 2024 <0.014  -We will continue with active surveillance at this time with a repeat PSA in 6 months  -Should he require treatment, will consider relugolix rather than Eligard     2.  Pituitary macroadenoma  -Follows with neurosurgery at   -Most recent MRI in June 2023 showing stable disease with plans to repeat in 1 year       Follow Up:   Follow-up in 6 months     Stanford Fonseca, " MD  Hematology and Oncology     Please note that portions of this note may have been completed with a voice recognition program. Efforts were made to edit the dictations, but occasionally words are mistranscribed.

## 2024-05-28 ENCOUNTER — LAB (OUTPATIENT)
Dept: LAB | Facility: HOSPITAL | Age: 84
End: 2024-05-28
Payer: MEDICARE

## 2024-05-28 DIAGNOSIS — C61 PROSTATE CANCER: ICD-10-CM

## 2024-05-28 PROCEDURE — 84153 ASSAY OF PSA TOTAL: CPT

## 2024-05-28 PROCEDURE — 36415 COLL VENOUS BLD VENIPUNCTURE: CPT

## 2024-05-29 ENCOUNTER — OFFICE VISIT (OUTPATIENT)
Dept: UROLOGY | Facility: CLINIC | Age: 84
End: 2024-05-29
Payer: MEDICARE

## 2024-05-29 VITALS — SYSTOLIC BLOOD PRESSURE: 177 MMHG | HEART RATE: 65 BPM | DIASTOLIC BLOOD PRESSURE: 87 MMHG | OXYGEN SATURATION: 98 %

## 2024-05-29 DIAGNOSIS — N13.8 BPH WITH OBSTRUCTION/LOWER URINARY TRACT SYMPTOMS: ICD-10-CM

## 2024-05-29 DIAGNOSIS — C61 BIOCHEMICALLY RECURRENT CASTRATION-SENSITIVE ADENOCARCINOMA OF PROSTATE: Primary | ICD-10-CM

## 2024-05-29 DIAGNOSIS — R97.21 BIOCHEMICALLY RECURRENT CASTRATION-SENSITIVE ADENOCARCINOMA OF PROSTATE: Primary | ICD-10-CM

## 2024-05-29 DIAGNOSIS — N40.1 BPH WITH OBSTRUCTION/LOWER URINARY TRACT SYMPTOMS: ICD-10-CM

## 2024-05-29 DIAGNOSIS — Z19.1 BIOCHEMICALLY RECURRENT CASTRATION-SENSITIVE ADENOCARCINOMA OF PROSTATE: Primary | ICD-10-CM

## 2024-05-29 LAB — PSA SERPL-MCNC: 0.08 NG/ML (ref 0–4)

## 2024-05-29 RX ORDER — AMLODIPINE BESYLATE 5 MG/1
5 TABLET ORAL DAILY
COMMUNITY
Start: 2024-05-15 | End: 2025-05-15

## 2024-05-29 NOTE — PROGRESS NOTES
Follow Up Office Visit      Patient Name: Phillip Bridges  : 1940   MRN: [id number]     Chief Complaint:    Chief Complaint   Patient presents with    Prostate Cancer       Referring Provider: No ref. provider found    History of Present Illness: Phillip Bridges is a 83 y.o. male who presents today for follow up of prostate cancer s/p IMRT remotely in [place].     He has mild BPH symptoms. Flomax makes him dizzy and he has elected to discontinue this.     Patient originally seen in 2023.  History of IMRT radiation therapy for prostate cancer in the early .  Transition care to [facility].  Patient's PSA had risen over the last 4 years.     PSA in 2018 was 0.42.   PSA in  was 2.0  PSA in  was 2.28  PSA in 2022 was 3.90  PSA in Oct 2022 was 5.48     Patient was on Eligard and Casodex for presumed biochemical recurrence.   Underwent PSMA PET scan which was negative for obvious metastatic disease.      He is following with medical oncology.    Lab Results   Component Value Date    PSA 0.077 2024    PSA <0.014 02/15/2024    PSA <0.014 2023    PSA <0.014 2023    PSA <0.014 2023     PSA remains very low, however slightly detectable.          Subjective      Review of System: Review of Systems   Genitourinary: Negative.       I have reviewed the ROS documented by my clinical staff, I have updated appropriately and I agree. Los Scales MD    I have reviewed and the following portions of the patient's history were updated as appropriate: past family history, past medical history, past social history, past surgical history and problem list.    Medications:     Current Outpatient Medications:     amLODIPine (NORVASC) 5 MG tablet, Take 1 tablet by mouth Daily., Disp: , Rfl:     clobetasol (CLOBEX) 0.05 % lotion, Apply 1 Application topically to the appropriate area as directed 2 (Two) Times a Day., Disp: , Rfl:     eszopiclone (LUNESTA) 1 MG tablet, As Needed.,  Disp: , Rfl:     Flovent HFA 44 MCG/ACT inhaler, Inhale 1 puff 2 (Two) Times a Day., Disp: , Rfl:     ketoconazole (NIZORAL) 2 % cream, Apply 1 Application topically to the appropriate area as directed Daily., Disp: , Rfl:     levothyroxine (SYNTHROID, LEVOTHROID) 75 MCG tablet, , Disp: , Rfl:     Olmesartan Medoxomil (BENICAR PO), Take 20 mg by mouth Daily., Disp: , Rfl:     erythromycin (ROMYCIN) 5 MG/GM ophthalmic ointment, Administer 1 Application to both eyes Every Night. (Patient not taking: Reported on 5/29/2024), Disp: , Rfl:     fluticasone (FLONASE) 50 MCG/ACT nasal spray, 1 spray into the nostril(s) as directed by provider As Needed. (Patient not taking: Reported on 5/29/2024), Disp: , Rfl:     neomycin-polymyxin-dexamethamethasone (POLYDEX) 3.5-30083-0.1 ointment ophthalmic ointment, Administer 1 Application to both eyes Every 8 (Eight) Hours. (Patient not taking: Reported on 5/29/2024), Disp: , Rfl:     Allergies:   No Known Allergies       Objective     Physical Exam:   Vital Signs:   Vitals:    05/29/24 1129   BP: 177/87   Pulse: 65   SpO2: 98%     There is no height or weight on file to calculate BMI.     Physical Exam  Constitutional:       Appearance: Normal appearance.   HENT:      Head: Normocephalic and atraumatic.      Nose: Nose normal.   Eyes:      Extraocular Movements: Extraocular movements intact.      Conjunctiva/sclera: Conjunctivae normal.      Pupils: Pupils are equal, round, and reactive to light.   Musculoskeletal:         General: Normal range of motion.      Cervical back: Normal range of motion and neck supple.   Skin:     General: Skin is warm and dry.      Findings: No lesion or rash.   Neurological:      General: No focal deficit present.      Mental Status: He is alert and oriented to person, place, and time. Mental status is at baseline.   Psychiatric:         Mood and Affect: Mood normal.         Behavior: Behavior normal.         Labs:   Brief Urine Lab Results  (Last  "result in the past 365 days)        Color   Clarity   Blood   Leuk Est   Nitrite   Protein   CREAT   Urine HCG        11/27/23 1132 Yellow   Clear   Negative   Negative   Negative   1+                        Lab Results   Component Value Date    CALCIUM 8.8 (L) 05/24/2022     05/24/2022    K 4.1 05/24/2022    CO2 20 (L) 05/24/2022     05/24/2022    BUN 18 05/24/2022    CREATININE 0.91 05/24/2022    EGFRIFAFRI 50 06/30/2023    EGFRIFNONA >60 05/24/2022    BCR 20 05/24/2022    ANIONGAP 14 05/24/2022       No results found for: \"WBC\", \"HGB\", \"HCT\", \"MCV\", \"PLT\"    Images:   No Images in the past 120 days found..    Measures:   Tobacco:   Phillip Bridges  reports that he has quit smoking. His smoking use included cigarettes. He has been exposed to tobacco smoke. He has never used smokeless tobacco.      Assessment / Plan      Assessment/Plan:   83 y.o. male who presented today for follow up of prostate cancer status post remote radiation with biochemical recurrence, completed Casodex and Eligard therapy for the last few years.  He has been off this for some time.  PSA is still very low but still persistently lightly detectable.  I am not overly concerned about his risk of dying from prostate cancer given extremely low PSA, previously negative PET scan, and his age.  Will continue checking his PSA in a 6-month basis.  Patient reports understanding.    Diagnoses and all orders for this visit:    1. 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 BPH with obstruction/lower urinary tract symptoms  -Patient is monitoring off medications as he developed hypotension related to tamsulosin, IPSS mild to moderate, quality of life relatively preserved         Follow Up:   Return in about 6 months (around 11/29/2024) for Follow Up after Labs.    I spent approximately 20 minutes providing clinical care for this patient; including review of patient's chart " and provider documentation, face to face time spent with patient in examination room (obtaining history, performing physical exam, discussing diagnosis and management options), placing orders, and completing patient documentation.     Los Scales MD  [facility] [place]

## 2024-08-20 ENCOUNTER — LAB (OUTPATIENT)
Dept: LAB | Facility: HOSPITAL | Age: 84
End: 2024-08-20
Payer: MEDICARE

## 2024-08-20 DIAGNOSIS — C61 PROSTATE CANCER: ICD-10-CM

## 2024-08-20 LAB — PSA SERPL-MCNC: 0.52 NG/ML (ref 0–4)

## 2024-08-20 PROCEDURE — 36415 COLL VENOUS BLD VENIPUNCTURE: CPT

## 2024-08-20 PROCEDURE — 84153 ASSAY OF PSA TOTAL: CPT

## 2024-08-23 ENCOUNTER — OFFICE VISIT (OUTPATIENT)
Dept: ONCOLOGY | Facility: CLINIC | Age: 84
End: 2024-08-23
Payer: MEDICARE

## 2024-08-23 VITALS
DIASTOLIC BLOOD PRESSURE: 72 MMHG | WEIGHT: 190 LBS | OXYGEN SATURATION: 98 % | BODY MASS INDEX: 26.6 KG/M2 | SYSTOLIC BLOOD PRESSURE: 163 MMHG | TEMPERATURE: 97.5 F | HEIGHT: 71 IN | HEART RATE: 69 BPM

## 2024-08-23 DIAGNOSIS — C61 PROSTATE CANCER: Primary | ICD-10-CM

## 2024-08-23 DIAGNOSIS — R97.21 RISING PSA FOLLOWING TREATMENT FOR MALIGNANT NEOPLASM OF PROSTATE: ICD-10-CM

## 2024-08-23 PROCEDURE — 99214 OFFICE O/P EST MOD 30 MIN: CPT | Performed by: INTERNAL MEDICINE

## 2024-08-23 PROCEDURE — 1126F AMNT PAIN NOTED NONE PRSNT: CPT | Performed by: INTERNAL MEDICINE

## 2024-08-23 RX ORDER — EMPAGLIFLOZIN 10 MG/1
10 TABLET, FILM COATED ORAL DAILY
COMMUNITY
Start: 2024-07-09

## 2024-08-23 NOTE — PROGRESS NOTES
Follow Up Office Visit      Date: 2024     Patient Name: Phillip Bridges  MRN: [id number]  : 1940  Referring Physician: Los Scales     Chief Complaint: Follow-up for castrate sensitive prostate cancer     History of Present Illness: Phillip Bridges is a pleasant 82 y.o. male past medical history of seasonal allergies, hypothyroidism, hypertension, prostate cancer who presents today for evaluation of prostate cancer. The patient is accompanied by their wife who contributes to the history of their care.  Patient was initially diagnosed in  and underwent definitive radiation therapy.  Pathology results not available to me from his initial diagnosis.  He was on observation with normal PSAs until  when his PSA started to slowly creep up over the past 6 years until eventually reaching a range of 5.48 in 2022.  He was started on Eligard as well as Casodex.  Had significant side effects with Eligard in terms of fatigue, hot flashes, swelling, emotional changes.  He was seen by Dr. Scales who ordered a PSA and a PSMA PET/CT.  PSA in May 2023 was <0.014 and his PSMA PET/CT did not have any disease noted.  He is currently doing well with no major toxicities.     Interval History:  Presents to clinic for follow-up.  No major issues today.  Denies any bone pain or discomfort.  Was recently diagnosed with diabetes and is currently on Jardiance    Oncology History:    Oncology/Hematology History    No history exists.       Subjective      Review of Systems:   Constitutional: Negative for fevers, chills, or weight loss  Eyes: Negative for blurred vision or discharge         Ear/Nose/Throat: Negative for difficulty swallowing, sore throat, LAD                                                       Respiratory: Negative for cough, SOA, wheezing                                                                                        Cardiovascular: Negative for chest pain or palpitations                                                                   Gastrointestinal: Negative for nausea, vomiting or diarrhea                                                                     Genitourinary: Negative for dysuria or hematuria                                                                                           Musculoskeletal: Negative for any joint pains or muscle aches                                                                        Neurologic: Negative for any weakness, headaches, dizziness                                                                         Hematologic: Negative for any easy bleeding or bruising                                                                                   Psychiatric: Negative for anxiety or depression                          Past Medical History/Past Surgical History/ Family History/ Social History: Reviewed by me and unchanged from my previous documentation done on February 2024.     Medications:     Current Outpatient Medications:     Jardiance 10 MG tablet tablet, Take 1 tablet by mouth Daily., Disp: , Rfl:     amLODIPine (NORVASC) 5 MG tablet, Take 1 tablet by mouth As Needed., Disp: , Rfl:     clobetasol (CLOBEX) 0.05 % lotion, Apply 1 Application topically to the appropriate area as directed 2 (Two) Times a Day., Disp: , Rfl:     eszopiclone (LUNESTA) 1 MG tablet, As Needed., Disp: , Rfl:     Flovent HFA 44 MCG/ACT inhaler, Inhale 1 puff 2 (Two) Times a Day., Disp: , Rfl:     ketoconazole (NIZORAL) 2 % cream, Apply 1 Application topically to the appropriate area as directed Daily., Disp: , Rfl:     levothyroxine (SYNTHROID, LEVOTHROID) 75 MCG tablet, , Disp: , Rfl:     Olmesartan Medoxomil (BENICAR PO), Take 20 mg by mouth Daily., Disp: , Rfl:     Allergies:   No Known Allergies    Objective     Physical Exam:  Vital Signs:   Vitals:    08/23/24 1020   BP: 163/72   Pulse: 69   Temp: 97.5 °F (36.4 °C)   TempSrc: Infrared   SpO2: 98%   Weight: 86.2 kg (190  "lb)   Height: 180.3 cm (70.98\")   PainSc: 0-No pain     Pain Score    08/23/24 1020   PainSc: 0-No pain     ECOG Performance Status: 0 - Asymptomatic    Constitutional: NAD, ECOG 0  Eyes: PERRLA, scleral anicteric  ENT: No LAD, no thyromegaly  Respiratory: CTAB, no wheezing, rales, rhonchi  Cardiovascular: RRR, no murmurs, pulses 2+ bilaterally  Abdomen: soft, NT/ND, no HSM  Musculoskeletal: strength 5/5 bilaterally, no c/c/e  Neurologic: A&O x 3, CN II-XII intact grossly    Results Review:   Lab on 08/20/2024   Component Date Value Ref Range Status    PSA 08/20/2024 0.522  0.000 - 4.000 ng/mL Final       No results found.    Assessment / Plan      Assessment/Plan:   1. Prostate cancer (Primary)  -Initially diagnosed in 2008 and status post definitive radiation treatment  -PSA slowly rising from 2007 from 0.35 until October 20 22-5.48  -Castrate sensitive disease and likely low risk disease based off the amount of time for his PSA to increase  -Started on Eligard and Casodex in October 2022 with significant toxicities related to the Eligard  -PSA in May 2023 <0.014 and PSMA PET/CT with no disease noted  -PSA in August 2023 < 0.014  -PSA in November 2023 <0.014  -PSA in February 2024 <0.014  -PSA in May 2024 0.077  -PSA in August 2024 0.522  -Given with a significant rise in his PSA over the past 3-6 months have ordered a PSMA PET/CT  -Should he require treatment, will consider relugolix rather than Eligard     2.  Pituitary macroadenoma  -Follows with neurosurgery at   -Most recent MRI in June 2023 showing stable disease with plans to repeat in 1 year         Follow Up:   Follow-up  after PSMA PET     Stanford Fonseca MD  Hematology and Oncology     Please note that portions of this note may have been completed with a voice recognition program. Efforts were made to edit the dictations, but occasionally words are mistranscribed.   "

## 2024-09-06 ENCOUNTER — HOSPITAL ENCOUNTER (OUTPATIENT)
Dept: PET IMAGING | Facility: HOSPITAL | Age: 84
Discharge: HOME | End: 2024-09-06
Payer: MEDICARE

## 2024-09-06 DIAGNOSIS — C61 PROSTATE CANCER: ICD-10-CM

## 2024-09-06 DIAGNOSIS — R97.21 RISING PSA FOLLOWING TREATMENT FOR MALIGNANT NEOPLASM OF PROSTATE: ICD-10-CM

## 2024-09-06 PROCEDURE — A9595 PIFLUFOLASTAT F 18 9 MCI SOLUTION PREFILLED SYRINGE: HCPCS | Performed by: INTERNAL MEDICINE

## 2024-09-06 PROCEDURE — 0 PIFLUFOLASTAT F 18 9 MCI SOLUTION PREFILLED SYRINGE: Performed by: INTERNAL MEDICINE

## 2024-09-06 PROCEDURE — 78815 PET IMAGE W/CT SKULL-THIGH: CPT

## 2024-09-06 RX ADMIN — PIFLUFOLASTAT F-18 1 DOSE: 80 INJECTION INTRAVENOUS at 10:59

## 2024-09-10 ENCOUNTER — OFFICE VISIT (OUTPATIENT)
Dept: ONCOLOGY | Facility: CLINIC | Age: 84
End: 2024-09-10
Payer: MEDICARE

## 2024-09-10 VITALS
RESPIRATION RATE: 18 BRPM | WEIGHT: 191 LBS | HEART RATE: 69 BPM | BODY MASS INDEX: 26.74 KG/M2 | TEMPERATURE: 97.7 F | HEIGHT: 71 IN | DIASTOLIC BLOOD PRESSURE: 83 MMHG | SYSTOLIC BLOOD PRESSURE: 171 MMHG | OXYGEN SATURATION: 98 %

## 2024-09-10 DIAGNOSIS — C61 PROSTATE CANCER: Primary | ICD-10-CM

## 2024-09-10 PROCEDURE — 1126F AMNT PAIN NOTED NONE PRSNT: CPT | Performed by: INTERNAL MEDICINE

## 2024-09-10 PROCEDURE — 99214 OFFICE O/P EST MOD 30 MIN: CPT | Performed by: INTERNAL MEDICINE

## 2024-09-10 RX ORDER — METFORMIN HCL 500 MG
1 TABLET, EXTENDED RELEASE 24 HR ORAL DAILY
COMMUNITY
Start: 2024-09-06

## 2024-09-10 RX ORDER — VALACYCLOVIR HYDROCHLORIDE 1 G/1
1000 TABLET, FILM COATED ORAL DAILY
COMMUNITY
Start: 2024-09-09

## 2024-09-10 NOTE — PROGRESS NOTES
Follow Up Office Visit      Date: 09/10/2024     Patient Name: Phillip Bridges  MRN: [id number]  : 1940  Referring Physician: Los Scales     Chief Complaint: Follow-up for castrate sensitive prostate cancer     History of Present Illness: Phillip Bridges is a pleasant 82 y.o. male past medical history of seasonal allergies, hypothyroidism, hypertension, prostate cancer who presents today for evaluation of prostate cancer. The patient is accompanied by their wife who contributes to the history of their care.  Patient was initially diagnosed in  and underwent definitive radiation therapy.  Pathology results not available to me from his initial diagnosis.  He was on observation with normal PSAs until  when his PSA started to slowly creep up over the past 6 years until eventually reaching a range of 5.48 in 2022.  He was started on Eligard as well as Casodex.  Had significant side effects with Eligard in terms of fatigue, hot flashes, swelling, emotional changes.  He was seen by Dr. Scales who ordered a PSA and a PSMA PET/CT.  PSA in May 2023 was <0.014 and his PSMA PET/CT did not have any disease noted.  He is currently doing well with no major toxicities.     Interval History:  Presents to clinic for follow-up.  Continuing to do well.  Denies any bone pain or discomfort.  Denies any issues with urination    Oncology History:    Oncology/Hematology History    No history exists.       Subjective      Review of Systems:   Constitutional: Negative for fevers, chills, or weight loss  Eyes: Negative for blurred vision or discharge         Ear/Nose/Throat: Negative for difficulty swallowing, sore throat, LAD                                                       Respiratory: Negative for cough, SOA, wheezing                                                                                        Cardiovascular: Negative for chest pain or palpitations                                                                   Gastrointestinal: Negative for nausea, vomiting or diarrhea                                                                     Genitourinary: Negative for dysuria or hematuria                                                                                           Musculoskeletal: Negative for any joint pains or muscle aches                                                                        Neurologic: Negative for any weakness, headaches, dizziness                                                                         Hematologic: Negative for any easy bleeding or bruising                                                                                   Psychiatric: Negative for anxiety or depression                          Past Medical History/Past Surgical History/ Family History/ Social History: Reviewed by me and unchanged from my previous documentation done on August 2024 .     Medications:     Current Outpatient Medications:     amLODIPine (NORVASC) 5 MG tablet, Take 1 tablet by mouth As Needed., Disp: , Rfl:     clobetasol (CLOBEX) 0.05 % lotion, Apply 1 Application topically to the appropriate area as directed 2 (Two) Times a Day., Disp: , Rfl:     eszopiclone (LUNESTA) 1 MG tablet, As Needed., Disp: , Rfl:     ketoconazole (NIZORAL) 2 % cream, Apply 1 Application topically to the appropriate area as directed Daily., Disp: , Rfl:     levothyroxine (SYNTHROID, LEVOTHROID) 75 MCG tablet, , Disp: , Rfl:     metFORMIN ER (GLUCOPHAGE-XR) 500 MG 24 hr tablet, Take 1 tablet by mouth Daily., Disp: , Rfl:     Olmesartan Medoxomil (BENICAR PO), Take 20 mg by mouth Daily., Disp: , Rfl:     valACYclovir (VALTREX) 1000 MG tablet, Take 1 tablet by mouth Daily., Disp: , Rfl:     Allergies:   No Known Allergies    Objective     Physical Exam:  Vital Signs:   Vitals:    09/10/24 0851   BP: 171/83   Pulse: 69   Resp: 18   Temp: 97.7 °F (36.5 °C)   SpO2: 98%   Weight: 86.6 kg (191 lb)   Height: 180.3 cm  "(71\")   PainSc: 0-No pain     Pain Score    09/10/24 0851   PainSc: 0-No pain     ECOG Performance Status: 1 - Symptomatic but completely ambulatory    Constitutional: NAD, ECOG 1  Eyes: PERRLA, scleral anicteric  ENT: No LAD, no thyromegaly  Respiratory: CTAB, no wheezing, rales, rhonchi  Cardiovascular: RRR, no murmurs, pulses 2+ bilaterally  Abdomen: soft, NT/ND, no HSM  Musculoskeletal: strength 5/5 bilaterally, no c/c/e  Neurologic: A&O x 3, CN II-XII intact grossly    Results Review:   No visits with results within 2 Week(s) from this visit.   Latest known visit with results is:   Lab on 08/20/2024   Component Date Value Ref Range Status    PSA 08/20/2024 0.522  0.000 - 4.000 ng/mL Final       NM PET/CT Skull Base to Mid Thigh    Result Date: 9/8/2024  Narrative: Pilufolastat NM PET/CT SKULL BASE TO MID THIGH Date of Exam: 9/6/2024 10:40 AM EDT Indication: prostate ca, rising PSA. Radiation treatment 15 years ago. Repeat diagnosis 3 years ago. Most recent PSA 0.522 Comparison: 5/12/2023 Pylarify PET/CT Technique: 8.36 mCi of F-18 Pylarify was administered intravenously. PET imaging was obtained from the vertex to mid-thigh approximately 60 minutes after radiotracer injection. A low dose non contrast CT was obtained for attenuation correction and anatomic localization. Fused PET-CT and 3D MIP reconstructions were utilized for image interpretation. Findings: Head/Neck: No suspicious metabolic activity. Physiologic activity is present. Thorax: No suspicious metabolic activity. Physiologic activity is present. Abdomen and Pelvis: No suspicious metabolic activity. Physiologic activity is present. Musculoskeletal System and Extremities:  No suspicious metabolic activity. Physiologic activity is present. Miscellaneous CT Findings: Cardiomegaly. Severe coronary artery atherosclerotic calcification. Findings of prior granulomatous disease exposure. Cholelithiasis without findings of acute cholecystitis. Small hiatal " hernia. Nonobstructing right intrarenal calculi. Small left renal cyst measures about a centimeter superior pole. Small cyst inferior pole left kidney. Severe arthroscopic calcification of the abdominal aorta. Moderate stool burden. Diverticulosis without diverticulitis. Mildly enlarged prostate gland. Fat-containing right inguinal hernia is small in size. It measures 2.2 x 2.1 cm.     Impression: No evidence active primary or metastatic disease. Normal Pylarify PSMA exam. Electronically Signed: Elli Monzon MD  9/8/2024 6:59 PM EDT  Workstation ID: [id number]     Assessment / Plan      Assessment/Plan:   1. Prostate cancer (Primary)  -Initially diagnosed in 2008 and status post definitive radiation treatment  -PSA slowly rising from 2007 from 0.35 until October 20 22-5.48  -Castrate sensitive disease and likely low risk disease based off the amount of time for his PSA to increase  -Started on Eligard and Casodex in October 2022 with significant toxicities related to the Eligard  -PSA in May 2023 <0.014 and PSMA PET/CT with no disease noted  -PSA in August 2023 < 0.014  -PSA in November 2023 <0.014  -PSA in February 2024 <0.014  -PSA in May 2024 0.077  -PSA in August 2024 0.522  -PSMA PET/CT in September 2024 without evidence of metastatic disease  -M0 disease at this time.  Discussed surveillance versus starting ADT.  Patient would like to proceed with surveillance.  Plan to repeat a PSA in 3 months  -Should he require treatment in the future, will consider relugolix rather than Eligard     2.  Pituitary macroadenoma  -Follows with neurosurgery at   -Most recent MRI in June 2023 showing stable disease with plans to repeat in 1 year         Follow Up:   Follow up in 3 months     Stanford Fonseca MD  Hematology and Oncology     Please note that portions of this note may have been completed with a voice recognition program. Efforts were made to edit the dictations, but occasionally words are mistranscribed.

## 2024-11-25 ENCOUNTER — LAB (OUTPATIENT)
Dept: LAB | Facility: HOSPITAL | Age: 84
End: 2024-11-25
Payer: MEDICARE

## 2024-11-25 DIAGNOSIS — C61 BIOCHEMICALLY RECURRENT CASTRATION-SENSITIVE ADENOCARCINOMA OF PROSTATE: ICD-10-CM

## 2024-11-25 DIAGNOSIS — R97.21 BIOCHEMICALLY RECURRENT CASTRATION-SENSITIVE ADENOCARCINOMA OF PROSTATE: ICD-10-CM

## 2024-11-25 DIAGNOSIS — Z19.1 BIOCHEMICALLY RECURRENT CASTRATION-SENSITIVE ADENOCARCINOMA OF PROSTATE: ICD-10-CM

## 2024-11-25 DIAGNOSIS — C61 PROSTATE CANCER: ICD-10-CM

## 2024-11-25 LAB — PSA SERPL-MCNC: 1.93 NG/ML (ref 0–4)

## 2024-11-25 PROCEDURE — 36415 COLL VENOUS BLD VENIPUNCTURE: CPT

## 2024-11-25 PROCEDURE — 84153 ASSAY OF PSA TOTAL: CPT

## 2024-12-04 ENCOUNTER — OFFICE VISIT (OUTPATIENT)
Dept: UROLOGY | Facility: CLINIC | Age: 84
End: 2024-12-04
Payer: MEDICARE

## 2024-12-04 VITALS — OXYGEN SATURATION: 97 % | HEART RATE: 59 BPM | SYSTOLIC BLOOD PRESSURE: 189 MMHG | DIASTOLIC BLOOD PRESSURE: 68 MMHG

## 2024-12-04 DIAGNOSIS — R39.9 LOWER URINARY TRACT SYMPTOMS (LUTS): Primary | ICD-10-CM

## 2024-12-04 DIAGNOSIS — C61 PROSTATE CANCER: ICD-10-CM

## 2024-12-04 RX ORDER — TAMSULOSIN HYDROCHLORIDE 0.4 MG/1
1 CAPSULE ORAL DAILY
COMMUNITY
Start: 2024-10-14 | End: 2024-12-04 | Stop reason: SINTOL

## 2024-12-04 RX ORDER — ESCITALOPRAM OXALATE 10 MG/1
10 TABLET ORAL DAILY
COMMUNITY
Start: 2024-11-13

## 2024-12-04 RX ORDER — SILODOSIN 4 MG/1
4 CAPSULE ORAL
Qty: 60 CAPSULE | Refills: 2 | Status: SHIPPED | OUTPATIENT
Start: 2024-12-04

## 2024-12-04 NOTE — PROGRESS NOTES
Follow Up Office Visit      Patient Name: Phillip Bridges  : 1940   MRN: [id number]     Chief Complaint:    Chief Complaint   Patient presents with    Biochemically recurrent castration-sensitive adenocarcinoma       Referring Provider: No ref. provider found    History of Present Illness: Phillip Bridges is a 84 y.o. male who presents today for follow up of  ***    Subjective      Review of System: Review of Systems   I have reviewed the ROS documented by my clinical staff, I have updated appropriately and I agree. Haley Morales    I have reviewed and the following portions of the patient's history were updated as appropriate: past family history, past medical history, past social history, past surgical history and problem list.    Medications:     Current Outpatient Medications:     amLODIPine (NORVASC) 5 MG tablet, Take 1 tablet by mouth As Needed., Disp: , Rfl:     clobetasol (CLOBEX) 0.05 % lotion, Apply 1 Application topically to the appropriate area as directed 2 (Two) Times a Day., Disp: , Rfl:     eszopiclone (LUNESTA) 1 MG tablet, As Needed., Disp: , Rfl:     ketoconazole (NIZORAL) 2 % cream, Apply 1 Application topically to the appropriate area as directed Daily., Disp: , Rfl:     levothyroxine (SYNTHROID, LEVOTHROID) 75 MCG tablet, , Disp: , Rfl:     metFORMIN ER (GLUCOPHAGE-XR) 500 MG 24 hr tablet, Take 1 tablet by mouth Daily., Disp: , Rfl:     Olmesartan Medoxomil (BENICAR PO), Take 20 mg by mouth Daily., Disp: , Rfl:     valACYclovir (VALTREX) 1000 MG tablet, Take 1 tablet by mouth Daily., Disp: , Rfl:     Allergies:   No Known Allergies          Objective     Physical Exam:   Vital Signs: There were no vitals filed for this visit.  There is no height or weight on file to calculate BMI.     Physical Exam    Labs:   Brief Urine Lab Results       None                 Lab Results   Component Value Date    CALCIUM 8.8 (L) 2022     2022    K 4.1 2022    CO2 20 (L) 2022     " 05/24/2022    BUN 18 05/24/2022    CREATININE 0.91 05/24/2022    EGFRIFAFRI 50 06/30/2023    EGFRIFNONA >60 05/24/2022    BCR 20 05/24/2022    ANIONGAP 14 05/24/2022       No results found for: \"WBC\", \"HGB\", \"HCT\", \"MCV\", \"PLT\"    Images:   NM PET/CT Skull Base to Mid Thigh    Result Date: 9/8/2024  No evidence active primary or metastatic disease. Normal Pylarify PSMA exam. Electronically Signed: Elli Monzon MD  9/8/2024 6:59 PM EDT  Workstation ID: [id number]      Measures:   Tobacco:   Phillip Bridges  reports that he has quit smoking. His smoking use included cigarettes. He has been exposed to tobacco smoke. He has never used smokeless tobacco. I have educated him on the risk of diseases from using tobacco products such as {Tobacco Cessation Diseases:77239::\"cancer\",\"COPD\",\"heart disease\"}.     I advised him to quit and he is {Willing/Not Willing to Quit Tobacco Products:86097}.    I spent {Time Spent Tobacco :11579} minutes counseling the patient.           Urine Incontinence: ( NOUI)  ***     Assessment / Plan      Assessment/Plan:   84 y.o. male who presented today for follow up of ***    There are no diagnoses linked to this encounter.       Follow Up:   No follow-ups on file.    I spent approximately *** minutes providing clinical care for this patient; including review of patient's chart and provider documentation, face to face time spent with patient in examination room (obtaining history, performing physical exam, discussing diagnosis and management options), placing orders, and completing patient documentation.     Los Scales MD  [facility]   "

## 2024-12-10 ENCOUNTER — OFFICE VISIT (OUTPATIENT)
Dept: ONCOLOGY | Facility: CLINIC | Age: 84
End: 2024-12-10
Payer: MEDICARE

## 2024-12-10 VITALS
HEIGHT: 71 IN | HEART RATE: 70 BPM | DIASTOLIC BLOOD PRESSURE: 73 MMHG | WEIGHT: 190 LBS | TEMPERATURE: 98 F | OXYGEN SATURATION: 98 % | RESPIRATION RATE: 16 BRPM | BODY MASS INDEX: 26.6 KG/M2 | SYSTOLIC BLOOD PRESSURE: 177 MMHG

## 2024-12-10 DIAGNOSIS — C61 PROSTATE CANCER: Primary | ICD-10-CM

## 2024-12-10 DIAGNOSIS — R97.21 RISING PSA FOLLOWING TREATMENT FOR MALIGNANT NEOPLASM OF PROSTATE: ICD-10-CM

## 2024-12-10 PROCEDURE — 1126F AMNT PAIN NOTED NONE PRSNT: CPT | Performed by: INTERNAL MEDICINE

## 2024-12-10 PROCEDURE — 99214 OFFICE O/P EST MOD 30 MIN: CPT | Performed by: INTERNAL MEDICINE

## 2024-12-10 NOTE — PROGRESS NOTES
Follow Up Office Visit      Date: 12/10/2024     Patient Name: Phillip Bridges  MRN: [id number]  : 1940  Referring Physician: Los Scales     Chief Complaint: Follow-up for castrate sensitive prostate cancer     History of Present Illness: Phillip Bridges is a pleasant 82 y.o. male past medical history of seasonal allergies, hypothyroidism, hypertension, prostate cancer who presents today for evaluation of prostate cancer. The patient is accompanied by their wife who contributes to the history of their care.  Patient was initially diagnosed in  and underwent definitive radiation therapy.  Pathology results not available to me from his initial diagnosis.  He was on observation with normal PSAs until  when his PSA started to slowly creep up over the past 6 years until eventually reaching a range of 5.48 in 2022.  He was started on Eligard as well as Casodex.  Had significant side effects with Eligard in terms of fatigue, hot flashes, swelling, emotional changes.  He was seen by Dr. Scales who ordered a PSA and a PSMA PET/CT.  PSA in May 2023 was <0.014 and his PSMA PET/CT did not have any disease noted.  He is currently doing well with no major toxicities.     Interval History:  Presents to clinic for follow-up.  No major issues today.  Denies any bone pain or discomfort.  Denies any worsening weakness or fatigue    Oncology History:    Oncology/Hematology History    No history exists.       Subjective      Review of Systems:   Constitutional: Negative for fevers, chills, or weight loss  Eyes: Negative for blurred vision or discharge         Ear/Nose/Throat: Negative for difficulty swallowing, sore throat, LAD                                                       Respiratory: Negative for cough, SOA, wheezing                                                                                        Cardiovascular: Negative for chest pain or palpitations                                                                   Gastrointestinal: Negative for nausea, vomiting or diarrhea                                                                     Genitourinary: Negative for dysuria or hematuria                                                                                           Musculoskeletal: Negative for any joint pains or muscle aches                                                                        Neurologic: Negative for any weakness, headaches, dizziness                                                                         Hematologic: Negative for any easy bleeding or bruising                                                                                   Psychiatric: Negative for anxiety or depression                          Past Medical History/Past Surgical History/ Family History/ Social History: Reviewed by me and unchanged from my previous documentation done on September 2024.     Medications:     Current Outpatient Medications:     amLODIPine (NORVASC) 5 MG tablet, Take 1 tablet by mouth As Needed. (Patient taking differently: Take 0.5 tablets by mouth As Needed.), Disp: , Rfl:     clobetasol (CLOBEX) 0.05 % lotion, Apply 1 Application topically to the appropriate area as directed 2 (Two) Times a Day., Disp: , Rfl:     escitalopram (LEXAPRO) 10 MG tablet, Take 1 tablet by mouth Daily., Disp: , Rfl:     eszopiclone (LUNESTA) 1 MG tablet, As Needed., Disp: , Rfl:     ketoconazole (NIZORAL) 2 % cream, Apply 1 Application topically to the appropriate area as directed Daily., Disp: , Rfl:     levothyroxine (SYNTHROID, LEVOTHROID) 75 MCG tablet, , Disp: , Rfl:     Olmesartan Medoxomil (BENICAR PO), Take 20 mg by mouth Daily., Disp: , Rfl:     sertraline (ZOLOFT) 50 MG tablet, Take 1 tablet by mouth Daily., Disp: , Rfl:     valACYclovir (VALTREX) 1000 MG tablet, Take 1 tablet by mouth Daily., Disp: , Rfl:     Allergies:   No Known Allergies    Objective     Physical Exam:  Vital Signs:  "  Vitals:    12/10/24 0916   BP: 177/73   Pulse: 70   Resp: 16   Temp: 98 °F (36.7 °C)   SpO2: 98%   Weight: 86.2 kg (190 lb)   Height: 180.3 cm (71\")   PainSc: 0-No pain     Pain Score    12/10/24 0916   PainSc: 0-No pain     ECOG Performance Status: 1 - Symptomatic but completely ambulatory    Constitutional: NAD, ECOG 1  Eyes: PERRLA, scleral anicteric  ENT: No LAD, no thyromegaly  Respiratory: CTAB, no wheezing, rales, rhonchi  Cardiovascular: RRR, no murmurs, pulses 2+ bilaterally  Abdomen: soft, NT/ND, no HSM  Musculoskeletal: strength 5/5 bilaterally, no c/c/e  Neurologic: A&O x 3, CN II-XII intact grossly    Results Review:   No visits with results within 2 Week(s) from this visit.   Latest known visit with results is:   Lab on 11/25/2024   Component Date Value Ref Range Status    PSA 11/25/2024 1.930  0.000 - 4.000 ng/mL Final       No results found.    Assessment / Plan      Assessment/Plan:   1. Prostate cancer (Primary)  -Initially diagnosed in 2008 and status post definitive radiation treatment  -PSA slowly rising from 2007 from 0.35 until October 20 22-5.48  -Castrate sensitive disease and likely low risk disease based off the amount of time for his PSA to increase  -Started on Eligard and Casodex in October 2022 with significant toxicities related to the Eligard  -PSA in May 2023 <0.014 and PSMA PET/CT with no disease noted  -PSA in August 2023 < 0.014  -PSA in November 2023 <0.014  -PSA in February 2024 <0.014  -PSA in May 2024 0.077  -PSA in August 2024 0.522  -PSMA PET/CT in September 2024 without evidence of metastatic disease  -PSA 1.9 in November 2024  -Will plan for repeat PSMA PET/CT to see if he still has M0 disease.  Pending imaging, will likely plan to initiate relugolix +/- radiation  -Would hold on further Lupron/Eligard and Casodex given significant toxicity previously     2.  Pituitary macroadenoma  -Follows with neurosurgery at   -Most recent MRI in June 2023 showing stable disease " with plans to repeat in 1 year         Follow Up:   Follow-up after PSMA PET     Stanford Fonseca MD  Hematology and Oncology     Please note that portions of this note may have been completed with a voice recognition program. Efforts were made to edit the dictations, but occasionally words are mistranscribed.

## 2024-12-26 ENCOUNTER — HOSPITAL ENCOUNTER (OUTPATIENT)
Facility: HOSPITAL | Age: 84
Discharge: HOME | End: 2024-12-26
Payer: MEDICARE

## 2024-12-26 DIAGNOSIS — C61 PROSTATE CANCER: ICD-10-CM

## 2024-12-26 DIAGNOSIS — R97.21 RISING PSA FOLLOWING TREATMENT FOR MALIGNANT NEOPLASM OF PROSTATE: ICD-10-CM

## 2024-12-26 PROCEDURE — A9596 GALLIUM GA 68 GOZETOTIDE 25 MCG KIT: HCPCS | Performed by: INTERNAL MEDICINE

## 2024-12-26 PROCEDURE — 34310000005 GALLIUM GA 68 GOZETOTIDE 25 MCG KIT: Performed by: INTERNAL MEDICINE

## 2024-12-26 PROCEDURE — 78815 PET IMAGE W/CT SKULL-THIGH: CPT

## 2024-12-26 RX ADMIN — KIT FOR THE PREPARATION OF GALLIUM GA 68 GOZETOTIDE INJECTION 1 DOSE: KIT INTRAVENOUS at 10:54

## 2024-12-31 ENCOUNTER — OFFICE VISIT (OUTPATIENT)
Dept: ONCOLOGY | Facility: CLINIC | Age: 84
End: 2024-12-31
Payer: MEDICARE

## 2024-12-31 VITALS
HEART RATE: 61 BPM | HEIGHT: 71 IN | RESPIRATION RATE: 16 BRPM | BODY MASS INDEX: 26.7 KG/M2 | WEIGHT: 190.7 LBS | OXYGEN SATURATION: 98 % | TEMPERATURE: 97.5 F | DIASTOLIC BLOOD PRESSURE: 88 MMHG | SYSTOLIC BLOOD PRESSURE: 191 MMHG

## 2024-12-31 DIAGNOSIS — C61 PROSTATE CANCER: Primary | ICD-10-CM

## 2024-12-31 PROCEDURE — 1126F AMNT PAIN NOTED NONE PRSNT: CPT | Performed by: INTERNAL MEDICINE

## 2024-12-31 PROCEDURE — 99214 OFFICE O/P EST MOD 30 MIN: CPT | Performed by: INTERNAL MEDICINE

## 2024-12-31 RX ORDER — OLMESARTAN MEDOXOMIL 5 MG/1
1 TABLET ORAL DAILY
COMMUNITY
Start: 2024-12-27

## 2024-12-31 RX ORDER — LIDOCAINE 50 MG/G
1 PATCH TOPICAL EVERY 24 HOURS
COMMUNITY
Start: 2024-12-19

## 2024-12-31 NOTE — PROGRESS NOTES
Follow Up Office Visit      Date: 2024     Patient Name: Phillip Bridges  MRN: [id number]  : 1940  Referring Physician: Los Scales     Chief Complaint: Follow-up for castrate sensitive prostate cancer     History of Present Illness: Phillip Bridges is a pleasant 82 y.o. male past medical history of seasonal allergies, hypothyroidism, hypertension, prostate cancer who presents today for evaluation of prostate cancer. The patient is accompanied by their wife who contributes to the history of their care.  Patient was initially diagnosed in  and underwent definitive radiation therapy.  Pathology results not available to me from his initial diagnosis.  He was on observation with normal PSAs until  when his PSA started to slowly creep up over the past 6 years until eventually reaching a range of 5.48 in 2022.  He was started on Eligard as well as Casodex.  Had significant side effects with Eligard in terms of fatigue, hot flashes, swelling, emotional changes.  He was seen by Dr. Scales who ordered a PSA and a PSMA PET/CT.  PSA in May 2023 was <0.014 and his PSMA PET/CT did not have any disease noted.  He is currently doing well with no major toxicities.     Interval History:  Presents to clinic for follow-up.  Doing well today.  Denies any new pain or discomfort.  Anxious about his PET scan results    Oncology History:    Oncology/Hematology History    No history exists.       Subjective      Review of Systems:   Constitutional: Negative for fevers, chills, or weight loss  Eyes: Negative for blurred vision or discharge         Ear/Nose/Throat: Negative for difficulty swallowing, sore throat, LAD                                                       Respiratory: Negative for cough, SOA, wheezing                                                                                        Cardiovascular: Negative for chest pain or palpitations                                                                   Gastrointestinal: Negative for nausea, vomiting or diarrhea                                                                     Genitourinary: Negative for dysuria or hematuria                                                                                           Musculoskeletal: Negative for any joint pains or muscle aches                                                                        Neurologic: Negative for any weakness, headaches, dizziness                                                                         Hematologic: Negative for any easy bleeding or bruising                                                                                   Psychiatric: Negative for anxiety or depression                          Past Medical History/Past Surgical History/ Family History/ Social History: Reviewed by me and unchanged from my previous documentation done on December 2024.     Medications:     Current Outpatient Medications:     amLODIPine (NORVASC) 5 MG tablet, Take 1 tablet by mouth As Needed. (Patient taking differently: Take 0.5 tablets by mouth As Needed.), Disp: , Rfl:     clobetasol (CLOBEX) 0.05 % lotion, Apply 1 Application topically to the appropriate area as directed 2 (Two) Times a Day., Disp: , Rfl:     escitalopram (LEXAPRO) 10 MG tablet, Take 1 tablet by mouth Daily., Disp: , Rfl:     eszopiclone (LUNESTA) 1 MG tablet, As Needed., Disp: , Rfl:     ketoconazole (NIZORAL) 2 % cream, Apply 1 Application topically to the appropriate area as directed Daily., Disp: , Rfl:     levothyroxine (SYNTHROID, LEVOTHROID) 75 MCG tablet, , Disp: , Rfl:     lidocaine (LIDODERM) 5 %, Place 1 patch on the skin as directed by provider Daily., Disp: , Rfl:     olmesartan (BENICAR) 5 MG tablet, Take 1 tablet by mouth Daily., Disp: , Rfl:     sertraline (ZOLOFT) 50 MG tablet, Take 1 tablet by mouth Daily., Disp: , Rfl:     valACYclovir (VALTREX) 1000 MG tablet, Take 1 tablet by mouth As Needed.,  "Disp: , Rfl:     Olmesartan Medoxomil (BENICAR PO), Take 20 mg by mouth Daily. (Patient not taking: Reported on 12/31/2024), Disp: , Rfl:     Allergies:   No Known Allergies    Objective     Physical Exam:  Vital Signs:   Vitals:    12/31/24 1042   BP: (!) 191/88   Pulse: 61   Resp: 16   Temp: 97.5 °F (36.4 °C)   TempSrc: Temporal   SpO2: 98%   Weight: 86.5 kg (190 lb 11.2 oz)   Height: 180.3 cm (70.98\")   PainSc: 0-No pain     Pain Score    12/31/24 1042   PainSc: 0-No pain     ECOG Performance Status: 0 - Asymptomatic    Constitutional: NAD, ECOG 0  Eyes: PERRLA, scleral anicteric  ENT: No LAD, no thyromegaly  Respiratory: CTAB, no wheezing, rales, rhonchi  Cardiovascular: RRR, no murmurs, pulses 2+ bilaterally  Abdomen: soft, NT/ND, no HSM  Musculoskeletal: strength 5/5 bilaterally, no c/c/e  Neurologic: A&O x 3, CN II-XII intact grossly    Results Review:   No visits with results within 2 Week(s) from this visit.   Latest known visit with results is:   Lab on 11/25/2024   Component Date Value Ref Range Status    PSA 11/25/2024 1.930  0.000 - 4.000 ng/mL Final       NM PET/CT Skull Base to Mid Thigh    Result Date: 12/26/2024  Narrative: Pilufolastat NM PET/CT SKULL BASE TO MID THIGH Date of Exam: 12/26/2024 10:56 AM EST Indication: rising PSA, prostate cancer. Prostate cancer diagnosed 2009 and 2021. Status post radiation. PSA 1.9 on 11/25/2024. Comparison: PSMA PET/CT 9/6/2024 Technique: 5.5 mCi of [id number] was administered intravenously. PET imaging was obtained from the vertex to mid-thigh approximately 60 minutes after radiotracer injection. A low dose non contrast CT was obtained for attenuation correction and anatomic  localization. Fused PET-CT and 3D MIP reconstructions were utilized for image interpretation. Findings: Head/Neck: No suspicious metabolic activity. Physiologic activity is present. Thorax: No suspicious metabolic activity. Physiologic activity is present. Abdomen and Pelvis: Minimal " uptake at the periphery of the prostate gland right aspect measuring to 5.05 and image 444. This is at the apex to mid body. Physiologic activity is present. Musculoskeletal System and Extremities:  No suspicious metabolic activity. Physiologic activity is present. Miscellaneous CT Findings: Cardiomegaly. Severe coronary artery atherosclerotic calcification. Small pericardial effusion. Findings of prior granulomatous disease exposure. Scarring in the left lower lobe. Cholelithiasis without inflammatory findings. Small nonobstructing bilateral intrarenal calculi. Probable cysts left kidney. Superior pole and inferior pole. Severe atherosclerotic calcification abdominal aorta. Moderate stool burden. Diverticulosis. No significant lymphadenopathy. Small fat-containing right inguinal hernia. Small fat-containing paraumbilical hernia.     Impression: New elevated uptake in the periphery of the prostate gland on the right. This is suspicious for recurrent disease. No metastatic findings. Electronically Signed: Elli Monzon MD  12/26/2024 7:28 PM EST  Workstation ID: [id number]     Assessment / Plan      Assessment/Plan:   1. Prostate cancer (Primary)  -Initially diagnosed in 2008 and status post definitive radiation treatment  -PSA slowly rising from 2007 from 0.35 until October 20 22-5.48  -Castrate sensitive disease and likely low risk disease based off the amount of time for his PSA to increase  -Started on Eligard and Casodex in October 2022 with significant toxicities related to the Eligard  -PSA in May 2023 <0.014 and PSMA PET/CT with no disease noted  -PSA in August 2023 < 0.014  -PSA in November 2023 <0.014  -PSA in February 2024 <0.014  -PSA in May 2024 0.077  -PSA in August 2024 0.522  -PSMA PET/CT in September 2024 without evidence of metastatic disease  -PSA 1.9 in November 2024  -PSMA PET/CT in December 2024 notable for elevated uptake in the periphery of the right prostate gland  -Ideally would like to initiate  hormone depletion therapy however he has had significant toxicities in the past  -Referred to radiation oncology today to see if he could  benefit from reirradiation to the area  -Should he not be a candidate for radiation therapy, would reinitiate ADT at that time     2.  Pituitary macroadenoma  -Follows with neurosurgery at   -Most recent MRI in June 2023 showing stable disease with plans to repeat in 1 year       Follow Up:   Follow-up in 1 month     Stanford Fonseca MD  Hematology and Oncology     Please note that portions of this note may have been completed with a voice recognition program. Efforts were made to edit the dictations, but occasionally words are mistranscribed.

## 2025-01-09 ENCOUNTER — OFFICE VISIT (OUTPATIENT)
Dept: RADIATION ONCOLOGY | Facility: HOSPITAL | Age: 85
End: 2025-01-09
Payer: MEDICARE

## 2025-01-09 ENCOUNTER — HOSPITAL ENCOUNTER (OUTPATIENT)
Dept: RADIATION ONCOLOGY | Facility: HOSPITAL | Age: 85
Setting detail: RADIATION/ONCOLOGY SERIES
Discharge: HOME | End: 2025-01-09
Payer: MEDICARE

## 2025-01-09 VITALS
BODY MASS INDEX: 26.94 KG/M2 | WEIGHT: 192.4 LBS | OXYGEN SATURATION: 98 % | SYSTOLIC BLOOD PRESSURE: 165 MMHG | HEART RATE: 73 BPM | TEMPERATURE: 98 F | HEIGHT: 71 IN | DIASTOLIC BLOOD PRESSURE: 73 MMHG | RESPIRATION RATE: 20 BRPM

## 2025-01-09 DIAGNOSIS — C61 PROSTATE CANCER: Primary | ICD-10-CM

## 2025-01-09 RX ORDER — FEXOFENADINE HCL 60 MG/1
60 TABLET, FILM COATED ORAL DAILY
COMMUNITY

## 2025-01-09 RX ORDER — ASPIRIN 81 MG/1
81 TABLET ORAL DAILY
COMMUNITY

## 2025-01-09 NOTE — PROGRESS NOTES
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 male who has a history of adenocarcinoma of the prostate treated in [place] greater than 15 years ago.  The patient reports that he received over 40 radiotherapy treatments.  Patient reports that he did not receive any fiducial markers for his prostate cancer at that time.  He did report that he tolerated the treatments well and has had minimal issues with his urination since parents on he reports that he has little bit of slow flow but denies any major complications.  Patient reports that his bowel function is normal.    The patient has previously been on ADT when he did developed and was diagnosed with recurrence in .  He did have problems tolerating the ADT at that time point.  Since then the patient is been on a break from ADT and ultimately had a restaging PET scan on 2024 that showed concern for recurrent disease in the right prostate only.  The patient discussed intermittent ADT but was referred to see if he be candidate for salvage radiotherapy.  The patient reports that he has done a fair amount of research including brachytherapy, cryotherapy and salvage radiotherapy.        BMI:  Body mass index is 26.83 kg/m².      Social History     Substance and Sexual Activity   Alcohol Use Not Currently       No Known Allergies    Social History     Tobacco Use    Smoking status: Former     Types: Cigarettes     Passive exposure: Past    Smokeless tobacco:  "Never   Vaping Use    Vaping status: Never Used   Substance Use Topics    Alcohol use: Not Currently    Drug use: Never         Past Medical History:   Diagnosis Date    Prostate cancer        family history is not on file.     History reviewed. No pertinent surgical history.     Review of Systems   Neurological:  Positive for light-headedness.    A full 14 point review of systems was performed and was negative except as noted in the HPI.         Objective   VITAL SIGNS:   Vitals:    01/09/25 1305   BP: 165/73   Pulse: 73   Resp: 20   Temp: 98 °F (36.7 °C)   SpO2: 98%   Weight: 87.3 kg (192 lb 6.4 oz)   Height: 180.3 cm (71\")   PainSc: 0-No pain        IPSS Questionnaire (AUA-7):  Over the past month…    1)  Incomplete Emptying  How often have you had a sensation of not emptying your bladder?  1 - Less than 1 time in 5   2)  Frequency  How often have you had to urinate less than every two hours? 1 - Less than 1 time in 5   3)  Intermittency  How often have you found you stopped and started again several times when you urinated?  0 - Not at all   4) Urgency  How often have you found it difficult to postpone urination?  1 - Less than 1 time in 5   5) Weak Stream  How often have you had a weak urinary stream?  1 - Less than 1 time in 5   6) Straining  How often have you had to push or strain to begin urination?  1 - Less than 1 time in 5   7) Nocturia  How many times did you typically get up at night to urinate?  1 - 1 time   Total Score:  6       Quality of life due to urinary symptoms:  If you were to spend the rest of your life with your urinary condition the way it is now, how would you feel about that? 1-Pleased   Urine Leakage (Incontinence) 0-No Leakage     Sexual Health Inventory  Current Status    1)  How do you rate your confidence that you could achieve and keep an erection? 1-Very Low   2) When you had erections with sexual stimulation, how often were your erections hard enough for penetration (entering " your partner)? 0-No sexual activity   3)  During sexual intercourse, how often were you able to maintain your erection after you had penetrated (entered) into your partner? 0-Did not attempt intercourse   4) During sexual intercourse, how difficult was it to maintain your erection to completion of intercourse? 0-Did not attempt intercourse   5) When you attempted sexual intercourse, how often was it satisfactory to you? 0-No sexual activity   Total Score: 1       Bowel Health Inventory  Current Status: 0-No problems, no rectal bleeding, no discharge, less then 5 bowel movements a day        KPS       80%    Physical Exam  Constitutional:       General: He is not in acute distress.     Appearance: He is well-developed.   HENT:      Head: Normocephalic and atraumatic.   Eyes:      Conjunctiva/sclera: Conjunctivae normal.      Pupils: Pupils are equal, round, and reactive to light.   Neck:      Trachea: No tracheal deviation.   Pulmonary:      Effort: Pulmonary effort is normal. No respiratory distress.      Breath sounds: No wheezing.   Abdominal:      General: There is no distension.      Palpations: Abdomen is soft.   Musculoskeletal:         General: Normal range of motion.      Cervical back: Normal range of motion.   Skin:     General: Skin is warm and dry.   Neurological:      Mental Status: He is alert.      Cranial Nerves: No cranial nerve deficit.      Coordination: Coordination normal.   Psychiatric:         Behavior: Behavior normal.         Judgment: Judgment normal.              The following portions of the patient's history were reviewed and updated as appropriate: allergies, current medications, past family history, past medical history, past social history, past surgical history, and problem list.  I have personally requested reviewed and interpreted the patient's images and radiology reports and pathology reports listed below:    I reviewed the patient's notes with Dr. Raymundo Scales I  discussed the patient with Dr. Scales personally today.  Assessment      IMPRESSION:     Patient is a very pleasant 84-year-old gentleman with a locally recurrent adenocarcinoma the prostate status post previous radiotherapy treatment in [place] greater than 15 years ago.  The patient had been on ADT after he was diagnosed with recurrence but tolerated that somewhat poorly.  His PSA is rising again and he is interested in resuming some type of treatment.  We discussed the risk and benefits after the patient today.  Complicating the patient's situation is the fact that obtaining his records from that area are likely and possible.  Also given the lack of fiducial markers I would not feel confident that the plan doses were actually those that were delivered to the tissue unless the planning margins were exceptionally large.  Further complicating matters does.  The patient has a fair amount of fibrosis in the area around the prostate and it seems unlikely that would be out of place to space O AR to protect the patient's rectum.  Based on the PET scan images it does appear that very proportion of his right mid gland is involved with cancer does appear to extend to an area that would be very close to the urethra.  We discussed the potential side effects and discussed the literature around salvage treatments.  We discussed several options and given the lack of records salvage [employer]ife with likely be a poor choice for the patient.  Salvage brachytherapy would also be a very difficult proposition for him as well and that perhaps salvage cryotherapy may be the best option given his likely high doses of radiotherapy utilized previously      RECOMMENDATIONS:      Locally recurrent adenocarcinoma of the prostate  -PSMA PET scan shows disease localized to the right mid gland  -Disease is somewhat close to the urethra  -Previously received treatments in [place] with external beam  -No fiducial markers for alignment  utilized  -Likely receive a dose of greater than 80 Gray  -Unable to fuse this prior treatment plan to any forward planning we can do.  -Patient previously tolerated ADT for a few years but then had problems.  -Patient was uninterested in the potential side effect profile of the salvage local therapy at this time is most interested in intermittent ADT             Aquilino Tran MD      Errors in dictation may reflect use of voice recognition software and not all errors in transcription may have been detected prior to signing.

## 2025-01-21 ENCOUNTER — OFFICE VISIT (OUTPATIENT)
Dept: ONCOLOGY | Facility: CLINIC | Age: 85
End: 2025-01-21
Payer: MEDICARE

## 2025-01-21 ENCOUNTER — SPECIALTY PHARMACY (OUTPATIENT)
Dept: ONCOLOGY | Facility: HOSPITAL | Age: 85
End: 2025-01-21
Payer: MEDICARE

## 2025-01-21 VITALS
HEIGHT: 71 IN | DIASTOLIC BLOOD PRESSURE: 82 MMHG | RESPIRATION RATE: 16 BRPM | HEART RATE: 69 BPM | BODY MASS INDEX: 26.74 KG/M2 | SYSTOLIC BLOOD PRESSURE: 158 MMHG | WEIGHT: 191 LBS | OXYGEN SATURATION: 97 % | TEMPERATURE: 97.1 F

## 2025-01-21 DIAGNOSIS — C61 PROSTATE CANCER: Primary | ICD-10-CM

## 2025-01-21 PROCEDURE — 99214 OFFICE O/P EST MOD 30 MIN: CPT | Performed by: INTERNAL MEDICINE

## 2025-01-21 PROCEDURE — 1126F AMNT PAIN NOTED NONE PRSNT: CPT | Performed by: INTERNAL MEDICINE

## 2025-01-21 RX ORDER — CLINDAMYCIN PHOSPHATE 10 MG/G
1 GEL TOPICAL 2 TIMES DAILY
COMMUNITY
Start: 2025-01-20

## 2025-01-21 NOTE — PROGRESS NOTES
Oral Chemotherapy - Double Check    Drug: relugolix  Strength: 120mg tablet  Directions: Take 3 tablets PO on Day 1 of Cycle 1 only then take 1 tablet PO daily thereafter  QTY: 30  RF:11    Released to pharmacy: [facility]    Completed independent double check on medication order/RX.    1/21/2025  13:30 EST

## 2025-01-21 NOTE — PROGRESS NOTES
Oral Chemotherapy - New Referral    Received a referral from Dr. Fonseca    Treatment Plan: Orgovyx (relugolix)    Start Date of Treatment: As soon as oral specialty medication is available.    Indication: Castrate sensitive prostate cancer    Relevant Past Treatments: The patient underwent definitive radiation therapy in 2008 for prostate cancer, then the patient was under observation only until his PSA started rising in 2017. The patient started leuprolide and bicalutamide after his PSA increased to 5.48 ng/mL in October 2022. The patient experienced significant side effects with leuprolide, including fatigue, hot flashes, swelling, and emotional changes. In December 2024, the patient's PSMA PET/CT scan had elevated uptake in the periphery of the right prostate gland. The patient was deemed to be high risk for radiation by Dr. Tran due to previously undergoing radiation therapy in the area. In January 2025, Dr. Fonseca recommends starting ADT with relugolix.     Is the therapy appropriate based on treatment guidelines and FDA labeling?: Yes    Therapeutic Goals: Continue treatment until progression or intolerable toxicity    Can the patient self-administer oral medications?: Yes    Supportive care medications:  A prescription for ondansetron 8 mg PO every 8 hours as needed for nausea or vomiting has not been sent to the patient's pharmacy yet.     Drug-Drug Interactions: The current medication list was reviewed and there are some relevant drug-drug interactions with the oral specialty medication that will be discussed during education, including:  Increased risk of QT prolongation with relugolix and ondansetron (will  the patient to monitor for dizziness, feeling lightheaded, or heart palpitations during education)     Medication Allergies: The patient has NKDA    Review of Labs/Dose Adjustments: The patient's most recent labs were reviewed and all are WNL to start treatment at this dose.     A  prescription was released to [facility] for   Drug: Orgovyx (relugolix)  Strength: 120 mg  Directions: Take 3 tablets by mouth on Cycle 1 Day 1 only, then take 1 tablet by mouth daily thereafter.  Quantity: 30 tablets  Refills: 11    Pharmacy education is scheduled for 1/29/25., CCA and consent will be signed at that time.    Cassidy Jeong, PharmD  Clinic Oncology Pharmacy Specialist  [phone]    1/21/2025  12:44 EST

## 2025-01-21 NOTE — PROGRESS NOTES
Follow Up Office Visit      Date: 2025     Patient Name: Phillip Bridges  MRN: [id number]  : 1940  Referring Physician: Los Scales     Chief Complaint: Follow-up for castrate sensitive prostate cancer     History of Present Illness: Phillip Bridges is a pleasant 82 y.o. male past medical history of seasonal allergies, hypothyroidism, hypertension, prostate cancer who presents today for evaluation of prostate cancer. The patient is accompanied by their wife who contributes to the history of their care.  Patient was initially diagnosed in  and underwent definitive radiation therapy.  Pathology results not available to me from his initial diagnosis.  He was on observation with normal PSAs until  when his PSA started to slowly creep up over the past 6 years until eventually reaching a range of 5.48 in 2022.  He was started on Eligard as well as Casodex.  Had significant side effects with Eligard in terms of fatigue, hot flashes, swelling, emotional changes.  He was seen by Dr. Scales who ordered a PSA and a PSMA PET/CT.  PSA in May 2023 was <0.014 and his PSMA PET/CT did not have any disease noted.  He is currently doing well with no major toxicities.     Interval History:  Presents to clinic for follow-up.  Continues to do well.  Denies any bone pain or discomfort    Oncology History:    Oncology/Hematology History   Prostate cancer   5/15/2023 Initial Diagnosis    Prostate cancer     2025 -  Chemotherapy    OP SUPPORTIVE PROSTATE Relugolix         Subjective      Review of Systems:   Constitutional: Negative for fevers, chills, or weight loss  Eyes: Negative for blurred vision or discharge         Ear/Nose/Throat: Negative for difficulty swallowing, sore throat, LAD                                                       Respiratory: Negative for cough, SOA, wheezing                                                                                        Cardiovascular: Negative for  chest pain or palpitations                                                                  Gastrointestinal: Negative for nausea, vomiting or diarrhea                                                                     Genitourinary: Negative for dysuria or hematuria                                                                                           Musculoskeletal: Negative for any joint pains or muscle aches                                                                        Neurologic: Negative for any weakness, headaches, dizziness                                                                         Hematologic: Negative for any easy bleeding or bruising                                                                                   Psychiatric: Negative for anxiety or depression                          Past Medical History/Past Surgical History/ Family History/ Social History: Reviewed by me and unchanged from my previous documentation done on December 2024.     Medications:     Current Outpatient Medications:     amLODIPine (NORVASC) 5 MG tablet, Take 1 tablet by mouth As Needed. (Patient taking differently: Take 0.5 tablets by mouth As Needed.), Disp: , Rfl:     aspirin 81 MG EC tablet, Take 1 tablet by mouth Daily., Disp: , Rfl:     clindamycin 1 % gel, Apply 1 Application topically to the appropriate area as directed 2 (Two) Times a Day., Disp: , Rfl:     clobetasol (CLOBEX) 0.05 % lotion, Apply 1 Application topically to the appropriate area as directed 2 (Two) Times a Day., Disp: , Rfl:     eszopiclone (LUNESTA) 1 MG tablet, As Needed., Disp: , Rfl:     fexofenadine (ALLEGRA) 60 MG tablet, Take 1 tablet by mouth Daily., Disp: , Rfl:     ketoconazole (NIZORAL) 2 % cream, Apply 1 Application topically to the appropriate area as directed Daily., Disp: , Rfl:     levothyroxine (SYNTHROID, LEVOTHROID) 75 MCG tablet, , Disp: , Rfl:     lidocaine (LIDODERM) 5 %, Place 1 patch on the skin as  "directed by provider Daily., Disp: , Rfl:     olmesartan (BENICAR) 5 MG tablet, Take 1 tablet by mouth Daily., Disp: , Rfl:     Olmesartan Medoxomil (BENICAR PO), Take 10 mg by mouth Daily., Disp: , Rfl:     sertraline (ZOLOFT) 50 MG tablet, Take 1 tablet by mouth Daily., Disp: , Rfl:     Allergies:   No Known Allergies    Objective     Physical Exam:  Vital Signs:   Vitals:    01/21/25 1034   BP: 158/82   Pulse: 69   Resp: 16   Temp: 97.1 °F (36.2 °C)   TempSrc: Temporal   SpO2: 97%   Weight: 86.6 kg (191 lb)   Height: 180.3 cm (70.98\")   PainSc: 0-No pain     Pain Score    01/21/25 1034   PainSc: 0-No pain     ECOG Performance Status: 0 - Asymptomatic    Constitutional: NAD, ECOG 0  Eyes: PERRLA, scleral anicteric  ENT: No LAD, no thyromegaly  Respiratory: CTAB, no wheezing, rales, rhonchi  Cardiovascular: RRR, no murmurs, pulses 2+ bilaterally  Abdomen: soft, NT/ND, no HSM  Musculoskeletal: strength 5/5 bilaterally, no c/c/e  Neurologic: A&O x 3, CN II-XII intact grossly    Results Review:   No visits with results within 2 Week(s) from this visit.   Latest known visit with results is:   Lab on 11/25/2024   Component Date Value Ref Range Status    PSA 11/25/2024 1.930  0.000 - 4.000 ng/mL Final       NM PET/CT Skull Base to Mid Thigh    Result Date: 12/26/2024  Narrative: Pilufolastat NM PET/CT SKULL BASE TO MID THIGH Date of Exam: 12/26/2024 10:56 AM EST Indication: rising PSA, prostate cancer. Prostate cancer diagnosed 2009 and 2021. Status post radiation. PSA 1.9 on 11/25/2024. Comparison: PSMA PET/CT 9/6/2024 Technique: 5.5 mCi of [id number] was administered intravenously. PET imaging was obtained from the vertex to mid-thigh approximately 60 minutes after radiotracer injection. A low dose non contrast CT was obtained for attenuation correction and anatomic  localization. Fused PET-CT and 3D MIP reconstructions were utilized for image interpretation. Findings: Head/Neck: No suspicious metabolic activity. " Physiologic activity is present. Thorax: No suspicious metabolic activity. Physiologic activity is present. Abdomen and Pelvis: Minimal uptake at the periphery of the prostate gland right aspect measuring to 5.05 and image 444. This is at the apex to mid body. Physiologic activity is present. Musculoskeletal System and Extremities:  No suspicious metabolic activity. Physiologic activity is present. Miscellaneous CT Findings: Cardiomegaly. Severe coronary artery atherosclerotic calcification. Small pericardial effusion. Findings of prior granulomatous disease exposure. Scarring in the left lower lobe. Cholelithiasis without inflammatory findings. Small nonobstructing bilateral intrarenal calculi. Probable cysts left kidney. Superior pole and inferior pole. Severe atherosclerotic calcification abdominal aorta. Moderate stool burden. Diverticulosis. No significant lymphadenopathy. Small fat-containing right inguinal hernia. Small fat-containing paraumbilical hernia.     Impression: New elevated uptake in the periphery of the prostate gland on the right. This is suspicious for recurrent disease. No metastatic findings. Electronically Signed: Elli Monzon MD  12/26/2024 7:28 PM EST  Workstation ID: [id number]     Assessment / Plan      Assessment/Plan:   1. Prostate cancer (Primary)  -Initially diagnosed in 2008 and status post definitive radiation treatment  -PSA slowly rising from 2007 from 0.35 until October 20 22-5.48  -Castrate sensitive disease and likely low risk disease based off the amount of time for his PSA to increase  -Started on Eligard and Casodex in October 2022 with significant toxicities related to the Eligard  -PSA in May 2023 <0.014 and PSMA PET/CT with no disease noted  -PSA in August 2023 < 0.014  -PSA in November 2023 <0.014  -PSA in February 2024 <0.014  -PSA in May 2024 0.077  -PSA in August 2024 0.522  -PSMA PET/CT in September 2024 without evidence of metastatic disease  -PSA 1.9 in November  2024  -PSMA PET/CT in December 2024 notable for elevated uptake in the periphery of the right prostate gland  -Was seen by Dr. Tran with radiation oncology and deemed to be high risk given his previous radiation therapy in the area  -Will plan to initiate ADT with relugolix.  Orders placed today    2.  Pituitary macroadenoma  -Follows with neurosurgery at   -Most recent MRI in June 2023 showing stable disease with plans to repeat in 1 year         Follow Up:   Follow-up in 6 weeks     Stanford Fonseca MD  Hematology and Oncology     Please note that portions of this note may have been completed with a voice recognition program. Efforts were made to edit the dictations, but occasionally words are mistranscribed.

## 2025-01-29 ENCOUNTER — LAB (OUTPATIENT)
Dept: LAB | Facility: HOSPITAL | Age: 85
End: 2025-01-29
Payer: MEDICARE

## 2025-01-29 ENCOUNTER — HOSPITAL ENCOUNTER (OUTPATIENT)
Dept: ONCOLOGY | Facility: HOSPITAL | Age: 85
Discharge: HOME | End: 2025-01-29
Payer: MEDICARE

## 2025-01-29 ENCOUNTER — SPECIALTY PHARMACY (OUTPATIENT)
Dept: ONCOLOGY | Facility: HOSPITAL | Age: 85
End: 2025-01-29
Payer: MEDICARE

## 2025-01-29 ENCOUNTER — OFFICE VISIT (OUTPATIENT)
Dept: ONCOLOGY | Facility: CLINIC | Age: 85
End: 2025-01-29
Payer: MEDICARE

## 2025-01-29 VITALS
DIASTOLIC BLOOD PRESSURE: 82 MMHG | WEIGHT: 189.6 LBS | BODY MASS INDEX: 26.54 KG/M2 | OXYGEN SATURATION: 98 % | HEIGHT: 71 IN | RESPIRATION RATE: 16 BRPM | SYSTOLIC BLOOD PRESSURE: 180 MMHG | TEMPERATURE: 97.1 F | HEART RATE: 69 BPM

## 2025-01-29 DIAGNOSIS — C61 PROSTATE CANCER: Primary | ICD-10-CM

## 2025-01-29 DIAGNOSIS — C61 PROSTATE CANCER: ICD-10-CM

## 2025-01-29 LAB
ALBUMIN SERPL-MCNC: 4.8 G/DL (ref 3.5–5.2)
ALBUMIN/GLOB SERPL: 2.3 G/DL
ALP SERPL-CCNC: 68 U/L (ref 39–117)
ALT SERPL W P-5'-P-CCNC: 18 U/L (ref 1–41)
ANION GAP SERPL CALCULATED.3IONS-SCNC: 11 MMOL/L (ref 5–15)
AST SERPL-CCNC: 18 U/L (ref 1–40)
BASOPHILS # BLD AUTO: 0.03 10*3/MM3 (ref 0–0.2)
BASOPHILS NFR BLD AUTO: 0.4 % (ref 0–1.5)
BILIRUB SERPL-MCNC: 0.3 MG/DL (ref 0–1.2)
BUN SERPL-MCNC: 21 MG/DL (ref 8–23)
BUN/CREAT SERPL: 25.3 (ref 7–25)
CALCIUM SPEC-SCNC: 9.4 MG/DL (ref 8.6–10.5)
CHLORIDE SERPL-SCNC: 105 MMOL/L (ref 98–107)
CO2 SERPL-SCNC: 25 MMOL/L (ref 22–29)
CREAT SERPL-MCNC: 0.83 MG/DL (ref 0.76–1.27)
DEPRECATED RDW RBC AUTO: 44.5 FL (ref 37–54)
EGFRCR SERPLBLD CKD-EPI 2021: 86.8 ML/MIN/1.73
EOSINOPHIL # BLD AUTO: 0.39 10*3/MM3 (ref 0–0.4)
EOSINOPHIL NFR BLD AUTO: 5.7 % (ref 0.3–6.2)
ERYTHROCYTE [DISTWIDTH] IN BLOOD BY AUTOMATED COUNT: 13.2 % (ref 12.3–15.4)
GLOBULIN UR ELPH-MCNC: 2.1 GM/DL
GLUCOSE SERPL-MCNC: 101 MG/DL (ref 65–99)
HCT VFR BLD AUTO: 39.1 % (ref 37.5–51)
HGB BLD-MCNC: 13 G/DL (ref 13–17.7)
IMM GRANULOCYTES # BLD AUTO: 0.01 10*3/MM3 (ref 0–0.05)
IMM GRANULOCYTES NFR BLD AUTO: 0.1 % (ref 0–0.5)
LYMPHOCYTES # BLD AUTO: 1.47 10*3/MM3 (ref 0.7–3.1)
LYMPHOCYTES NFR BLD AUTO: 21.6 % (ref 19.6–45.3)
MCH RBC QN AUTO: 30.1 PG (ref 26.6–33)
MCHC RBC AUTO-ENTMCNC: 33.2 G/DL (ref 31.5–35.7)
MCV RBC AUTO: 90.5 FL (ref 79–97)
MONOCYTES # BLD AUTO: 0.58 10*3/MM3 (ref 0.1–0.9)
MONOCYTES NFR BLD AUTO: 8.5 % (ref 5–12)
NEUTROPHILS NFR BLD AUTO: 4.33 10*3/MM3 (ref 1.7–7)
NEUTROPHILS NFR BLD AUTO: 63.7 % (ref 42.7–76)
PLATELET # BLD AUTO: 213 10*3/MM3 (ref 140–450)
PMV BLD AUTO: 9.3 FL (ref 6–12)
POTASSIUM SERPL-SCNC: 4.3 MMOL/L (ref 3.5–5.2)
PROT SERPL-MCNC: 6.9 G/DL (ref 6–8.5)
PSA SERPL-MCNC: 2.72 NG/ML (ref 0–4)
RBC # BLD AUTO: 4.32 10*6/MM3 (ref 4.14–5.8)
SODIUM SERPL-SCNC: 141 MMOL/L (ref 136–145)
WBC NRBC COR # BLD AUTO: 6.81 10*3/MM3 (ref 3.4–10.8)

## 2025-01-29 PROCEDURE — 84153 ASSAY OF PSA TOTAL: CPT

## 2025-01-29 PROCEDURE — 36415 COLL VENOUS BLD VENIPUNCTURE: CPT

## 2025-01-29 PROCEDURE — 85025 COMPLETE CBC W/AUTO DIFF WBC: CPT

## 2025-01-29 PROCEDURE — 99214 OFFICE O/P EST MOD 30 MIN: CPT | Performed by: NURSE PRACTITIONER

## 2025-01-29 PROCEDURE — 80053 COMPREHEN METABOLIC PANEL: CPT

## 2025-01-29 PROCEDURE — 1126F AMNT PAIN NOTED NONE PRSNT: CPT | Performed by: NURSE PRACTITIONER

## 2025-01-29 PROCEDURE — 1160F RVW MEDS BY RX/DR IN RCRD: CPT | Performed by: NURSE PRACTITIONER

## 2025-01-29 PROCEDURE — 1159F MED LIST DOCD IN RCRD: CPT | Performed by: NURSE PRACTITIONER

## 2025-01-29 RX ORDER — ONDANSETRON 8 MG/1
8 TABLET, FILM COATED ORAL 3 TIMES DAILY PRN
Qty: 30 TABLET | Refills: 5 | Status: SHIPPED | OUTPATIENT
Start: 2025-01-29

## 2025-01-29 NOTE — PROGRESS NOTES
Specialty Pharmacy Patient Management Program  Oncology Initial Assessment       Phillip Bridges is a 84 y.o. male with castrate sensitive prostate cancer seen by an Oncology provider and enrolled in the Oncology Patient Management program offered by [facility].  An initial outreach was conducted, including assessment of therapy appropriateness and specialty medication education for Orgovyx (relugolix). The patient was introduced to services offered by [facility], including: regular assessments, refill coordination, curbside pick-up or mail order delivery options, prior authorization maintenance, and financial assistance programs as applicable. The patient was also provided with contact information for the pharmacy team.     Regimen: Orgovyx (relugolix) 120 mg tablet - Take 3 tablets by mouth on Cycle 1 Day 1 only, then take 1 tablet by mouth daily thereafter.     Start date of oral specialty medication: As soon as oral specialty medication is available. The patient has not received relugolix from [facility] yet. The medication should be delivered to the patient's house tomorrow on 1/30/25 from [facility]. The patient is planning to have his baseline labs drawn today on 1/29/25.     Relevant Past Medical History, Comorbidities, and Vaccines  Relevant medical history and concomitant health conditions were discussed with the patient. The patient's chart has been reviewed for relevant past medical history and comorbid health conditions and updated as necessary.  Vaccines are coordinated by the patient's oncologist and primary care provider.  Past Medical History:   Diagnosis Date    Prostate cancer      Social History     Socioeconomic History    Marital status:    Tobacco Use    Smoking status: Former     Types: Cigarettes     Passive exposure: Past    Smokeless tobacco: Never   Vaping Use    Vaping status: Never Used   Substance and Sexual Activity    Alcohol use:  Not Currently    Drug use: Never    Sexual activity: Not Currently     Partners: Female     Allergies  Known allergies and reactions were discussed with the patient. The patient's chart has been reviewed for allergy information and updated as necessary.   No Known Allergies     Current Medication List  This medication list has been reviewed with the patient and evaluated for any interactions or necessary modifications/recommendations, and updated to include all prescription medications, OTC medications, and supplements the patient is currently taking.  This list reflects what is contained in the patient's profile, which has also been marked as reviewed to communicate to other providers it is the most up to date version of the patient's current medication therapy.   Prior to Admission medications    Medication Sig Start Date End Date Taking? Authorizing Provider   amLODIPine (NORVASC) 5 MG tablet Take 1 tablet by mouth As Needed.  Patient taking differently: Take 0.5 tablets by mouth As Needed. 5/15/24 5/15/25  Ya Renteria MD   aspirin 81 MG EC tablet Take 1 tablet by mouth Daily.    Ya Renteria MD   clindamycin 1 % gel Apply 1 Application topically to the appropriate area as directed 2 (Two) Times a Day. 1/20/25   Ya Renteria MD   clobetasol (CLOBEX) 0.05 % lotion Apply 1 Application topically to the appropriate area as directed 2 (Two) Times a Day. 11/15/23   Ya Renteria MD   eszopiclone (LUNESTA) 1 MG tablet As Needed. 5/17/23   Ya Renteria MD   fexofenadine (ALLEGRA) 60 MG tablet Take 1 tablet by mouth Daily.    Ya Renteria MD   ketoconazole (NIZORAL) 2 % cream Apply 1 Application topically to the appropriate area as directed Daily. 1/18/24   Ya Renteria MD   levothyroxine (SYNTHROID, LEVOTHROID) 75 MCG tablet  5/15/23   Ya Renteria MD   lidocaine (LIDODERM) 5 % Place 1 patch on the skin as directed by provider Daily. 12/19/24    "Ya Renteria MD   olmesartan (BENICAR) 5 MG tablet Take 1 tablet by mouth Daily. 12/27/24   Ya Renteria MD   Olmesartan Medoxomil (BENICAR PO) Take 10 mg by mouth Daily.    Ya Renteria MD   relugolix (ORGOVYX) 120 MG tablet tablet Take 3 tablets by mouth on Day 1 of Cycle 1 only, then take 1 tablet by mouth daily thereafter. 2/3/25   Stanford Fonseca MD   sertraline (ZOLOFT) 50 MG tablet Take 1 tablet by mouth Daily. 12/5/24   Ya Renteria MD     Drug Interactions  Reviewed concomitant medications, allergies, labs, comorbidities/medical history, and immunization history.   Drug-drug interactions noted and discussed during education:   Increased risk of QT prolongation with relugolix and ondansetron (counseled the patient to monitor for s/sx of QT prolongation, including dizziness, feeling lightheaded, or heart palpitations during education)  Reminded the patient to let us know before making any changes or starting any new prescription or OTC medications so we can first assess drug interactions.    Recommended Medications Assessment  Bone Health (such as calcium/vitamin D, bisphosphonate, RANKL inhibitor) - Not Indicated   VTE prophylaxis - Currently Taking - The patient is currently on Aspirin 81 mg PO daily  Prophylactic antimicrobials - Not Indicated   Tumor lysis syndrome prophylaxis - Not Indicated    Relevant Laboratory Values  Lab Results   Component Value Date    CALCIUM 8.8 (L) 05/24/2022     05/24/2022    K 4.1 05/24/2022    CO2 20 (L) 05/24/2022     05/24/2022    BUN 18 05/24/2022    CREATININE 0.91 05/24/2022    EGFRIFAFRI 50 06/30/2023    EGFRIFNONA >60 05/24/2022    BCR 20 05/24/2022    ANIONGAP 14 05/24/2022     No results found for: \"WBC\", \"RBC\", \"HGB\", \"HCT\", \"MCV\", \"MCH\", \"MCHC\", \"RDW\", \"RDWSD\", \"MPV\", \"PLT\", \"NEUTRORELPCT\", \"LYMPHORELPCT\", \"MONORELPCT\", \"EOSRELPCT\", \"BASORELPCT\", \"AUTOIGPER\", \"NEUTROABS\", \"LYMPHSABS\", \"MONOSABS\", \"EOSABS\", " "\"BASOSABS\", \"AUTOIGNUM\", \"NRBC\"    The above labs have been reviewed. The patient is planning to have his baseline labs drawn today on 1/29/25.     Initial Education Provided for Specialty Medication  The patient has been provided with the following education. All questions and concerns have been addressed prior to the patient receiving the medication, and the patient has verbalized understanding of the education and any materials provided.  Additional patient education shall be provided and documented upon request by the patient, provider or payer.      Provided patient with:   Chemo calendar to help improve adherence., Education sheets about the medication, 24-hour clinic phone number and my contact information and instructions to call should additional questions arise.     Medication Education Sheets Provided: (select all that apply)  Oral Specialty Medication: Orgovyx (relugolix)    Other Education Sheets Provided: (select all that apply)  Adherence, Symptom Tracker Sheet, and Proper Disposal Information    TOPICS COMMENTS   Storage and Handling of Oral Specialty Medication Store in the original container, in a dry location out of direct sunlight, and out of reach of children or pets. Store at room temperature. Discussed safe handling and what to do with any unused medication.   Administration of Oral Specialty Medication Take with or without food at the same time each day. Do not crush or chew tablets.   Adherence to Oral Specialty Regimen and Handling Missed Doses Patient is likely to have good treatment adherence; reinforced the importance of adherence. Reviewed how to address missed doses and encouraged the patient to let us know of any missed doses.   Nutrition and Appetite Changes:  importance of maintaining healthy diet & weight, ways to manage to improve intake, dietary consult, exercise regimen, electrolyte and/or blood glucose abnormalities Increased Blood Sugar: This patient's oncology therapy can " increase blood sugar.  Recommended that the patient monitor blood sugar more closely and contact their primary care provider for management recommendations if blood glucose values start trending upward. Lipid Panel Abnormalities:  Explained that the oncology therapy may lead to abnormalities in lipid values, specifically: high triglycerides.   Nausea/Vomiting: cause, use of antiemetics, dietary changes, when to call MD Emetic risk: Low-Minimal  PRN home meds: Ondansetron 8mg PO every 8 hours PRN nausea / vomiting  Pharmacy home meds sent to: Prescription has not been sent yet. The patient has a needs assessment with Patsy quinonez after education.     Instructed the patient to take a dose of the PRN medication at the first onset of nausea and if it's not working to call us for additional medications.  Also provided non-drug measures to mitigate nausea.   Nervous System Changes: causes, s/s, neuropathies, cognitive changes, ways to manage Hot Flashes: Discussed the possibility of hot flashes as well as mitigation strategies.   Pain: causes, ways to manage Discussed muscle and joint aches/pains with therapy, and recommended the use of OTC pain relief with ibuprofen or acetaminophen if needed.   Miscellaneous Financial Issues: $0 copay (gavin) at [facility] MELA Posada is the Pharmacy Care Coordinator.   Lab Draws: On or before day 1 of each cycle, no sooner than 3 days early.  Discussed the risk of QT prolongation with relugolix. Reviewed s/sx of QT prolongation with the patient and discussed when to call the clinic.    Infertility and Sexuality:  causes, fertility preservation options, sexuality changes, ways to manage, importance of birth control Oral Oncology Therapy: Reviewed safe sex practices and the importance of minimizing exposure to body fluids while on oral oncology therapy. The patient is not sexually active with a woman of childbearing potential.   Home Care: how to manage bodily fluids Counseled on  management of soiled linens and proper flush technique.  Discussed how to manage all the side effects at home and advised when to contact the MD office     Adherence and Self-Administration  Barriers to Patient Adherence and/or Self-Administration: None identified  Methods for Supporting Patient Adherence and/or Self-Administration: Dosing calendar  Expected duration of therapy: Until disease progression or intolerable toxicity    Goals of Therapy  Patient Goals of Therapy:   Consistently take medications as prescribed  Patient will adhere to medication regimen  Patient will report any medication side effects to healthcare provider  Clinical Goals:    Goals Addressed Today        Specialty Pharmacy General Goal      Clinical goal/therapeutic target: stable or decreasing PSA and disease control, per the recent oncology clinic notes and labs. {  PSA   Date Value Ref Range Status   11/25/2024 1.930 0.000 - 4.000 ng/mL Final   08/20/2024 0.522 0.000 - 4.000 ng/mL Final   05/28/2024 0.077 0.000 - 4.000 ng/mL Final   02/15/2024 <0.014 0.000 - 4.000 ng/mL Final   11/12/2023 <0.014 0.000 - 4.000 ng/mL Final   08/14/2023 <0.014 0.000 - 4.000 ng/mL Final   05/08/2023 <0.014 0.000 - 4.000 ng/mL Final   ]   -1/21/25: Dr. Fonseca recommends starting ADT with relugolix 120 mg PO on Cycle 1 Day 1 only, then 1 tablet PO daily thereafter for castrate-sensitive prostate cancer.             Support patient understanding of medication regimen  Ensure patient knows the pharmacy contact information  Schedule regular follow-up to monitor the treatment serious adverse events  Schedule regular follow-up to confirm medication adherence  Schedule regular follow-up to monitor disease progression or stability    Reassessment Plan & Follow-Up  Pharmacist to perform regular reassessments no more than (6) months from the previous assessment.  Welcome information and patient satisfaction survey to be sent by retail team with patient's initial  fill.  Care Coordinator to set up future refill outreaches, coordinate prescription delivery, and escalate clinical questions to pharmacist.     Additional Plans, Therapy Recommendations or Therapy Problems to Be Addressed:   -The patient has not received relugolix from [facility] yet. The medication should be delivered to the patient's house tomorrow on 1/30/25 from [facility]. The patient is planning to have his baseline labs drawn today on 1/29/25.     Attestation  I attest the patient was actively involved in and has agreed to the above plan of care. If the prescribed therapy is at any point deemed not appropriate based on the current or future assessments, a consultation will be initiated with the patient's specialty care provider to determine the best course of action. The revised plan of therapy will be documented along with any required assessments and/or additional patient education provided.     Cassidy Jeong, PharmD  Clinic Oncology Pharmacy Specialist  [phone]      Date and Time: 1/29/2025  10:11 EST

## 2025-01-29 NOTE — PROGRESS NOTES
CHEMOTHERAPY PREPARATION    Phillip Bridges  [id number]  1940    Chief Complaint: Treatment preparation and needs assessment    History of present illness:  Phillip Bridges is a 84 y.o. year old male who is here today with his wife for treatment preparation and needs assessment.  The patient has been diagnosed with prostate cancer and is scheduled to begin treatment with Relugolix.     Oncology History:    Oncology/Hematology History   Prostate cancer   5/15/2023 Initial Diagnosis    Prostate cancer     2/4/2025 -  Chemotherapy    OP SUPPORTIVE PROSTATE Relugolix         The current medication list and allergy list were reviewed and reconciled.     Past Medical History, Past Surgical History, Social History, Family History have been reviewed and are without significant changes except as mentioned.    Review of Systems:    Review of Systems   Constitutional:  Negative for appetite change, fatigue, fever and unexpected weight change.   HENT:  Negative for mouth sores, sore throat and trouble swallowing.    Respiratory:  Negative for cough, shortness of breath and wheezing.    Cardiovascular:  Negative for chest pain, palpitations and leg swelling.   Gastrointestinal:  Negative for abdominal distention, abdominal pain, constipation, diarrhea, nausea and vomiting.   Genitourinary:  Negative for difficulty urinating, dysuria and frequency.   Musculoskeletal:  Negative for arthralgias.   Skin:  Negative for pallor, rash and wound.   Neurological:  Negative for dizziness and weakness.   Hematological:  Does not bruise/bleed easily.   Psychiatric/Behavioral:  Negative for confusion and sleep disturbance. The patient is not nervous/anxious.      Physical Exam:    Vitals:    01/29/25 1103   BP: 180/82   Pulse: 69   Resp: 16   Temp: 97.1 °F (36.2 °C)   SpO2: 98%     Vitals:    01/29/25 1103   PainSc: 0-No pain          ECOG: (0) Fully Active - Able to Carry On All Pre-disease Performance Without Restriction    General: well  appearing, in no acute distress  Cardiovascular: regular rate and rhythm, no murmurs noted  Lungs: clear to auscultation bilaterally, respirations even and unlabored  Extremities: no lower extremity edema  Skin: no rashes, lesions, bruising, or petechiae  Psych: Mood is stable            NEEDS ASSESSMENTS    Genetics  The patient's new diagnosis and family history have been reviewed for genetic counseling needs. A genetic referral is not recommended.     Psychosocial  The patient has completed a PHQ-9 Depression Screening and the Distress Thermometer (DT) today.   PHQ-9 results show 1-4 (Minimal Depression). The patient scored their distress today as 0 on a scale of 0-10 with 0 being no distress and 10 being extreme distress.   Problems marked by the patient as being an issue for them within the last week include no problems.   Results were reviewed along with psychosocial resources offered by our cancer center.  Our oncology social worker will be flagged for a DT score of 4 or above, and a same day call will be made for a score of 9 or 10.  A mental health referral is offered at this time. The patient is not interested in a referral to [facility].   Copies of patient's questionnaires will be scanned into EMR for details and further reference.    Barriers to care  A barriers form was also completed by the patient today. We discussed services offered by our facility to help him have adequate access to care. The patient was given the name and contact information for our Oncology Social Worker, Sujey Burleson.  Based upon barriers assessment today, the patient will not require a follow-up call from the  to further discuss needs.   A copy of the barriers form will also be scanned into EMR for details and further reference.     VAD Assessment  The patient and I discussed planned intervenous chemotherapy as well as other IV treatments that are often needed throughout the course of treatment. These may  "include, but are not limited to blood transfusions, antibiotics, and IV hydration. The vasculature does appear to be adequate for multiple peripheral IVs throughout their treatment course.     Advanced Care Planning  The patient and I discussed advanced care planning, \"Conversations that Matter\".   This service was offered, free of charge, for development of advance directives with a certified ACP facilitator.  The patient does have an up-to-date advanced directive. This document is on file with our office. The patient is not interested in an appointment with one of our facilitators to create or update their advanced directives.      Palliative Care  The patient and I discussed palliative care services. Palliative care is not the same as Hospice care. This is specialized medical care for people living with serious illness with the goal of improving quality of life for the patient and their family. [facility] offers our patients outpatient palliative care early along with their treatment to assist in coordination of care, symptom management, pain management, and medical decision making.  Oncology criteria for palliative care referral is not met at this time. The patient is not interested in a palliative care consultation.     Additional Referral needs  none      CHEMOTHERAPY EDUCATION    Chemotherapy education completed and consent obtained per oncology pharmacist.  See their documentation for further details.    Booklets Given: Chemotherapy and You [x]  Nutrition for the Patient with Cancer During Treatment [x]    Sexuality/Fertility Books []     Chemotherapy Regimen:   Treatment Plans       Name Type Plan Dates Plan Provider         Active    OP SUPPORTIVE PROSTATE Relugolix ONCOLOGY SUPPORTIVE CARE 1 2/3/2025 - Present Stanford Fonseca MD                      Chemotherapy education comprehension reviewed. Questions answered.      Assessment and Plan:    Diagnoses and all orders for this visit:    1. Prostate cancer " (Primary)          The patient and I have reviewed their cancer diagnosis and scheduled treatment plan. Needs assessment was completed including genetics, psychosocial needs, barriers to care, VAD evaluation, advanced care planning, and palliative care services. Referrals have been ordered as appropriate based upon our evaluation and patient desires.     Chemotherapy teaching was also completed today per pharmacy.  Adequate time was given to answer questions.  Patient and family are aware of their care team members and contact information if they have questions or problems throughout the treatment course. The patient is adequately prepared to begin treatment as scheduled.     Reviewed with patient education regarding Zofran prescriptions sent to pharmacy.       Patient had pretreatment labs drawn today.    I spent 30 minutes caring for Phillip on this date of service. This time includes time spent by me in the following activities: preparing for the visit, reviewing tests, counseling and educating the patient/family/caregiver, referring and communicating with other health care professionals, documenting information in the medical record, and care coordination.     Patsy Ramsay, APRN  01/29/25

## 2025-01-30 NOTE — PROGRESS NOTES
Specialty Pharmacy Refill Coordination Note     Orgovyx received from [facility] specialty pharmacy and first dose will be taken on 2/1/2025.      Delivery Questions      Flowsheet Row Most Recent Value   Delivery method UPS   Delivery address verified with patient/caregiver? Yes   Delivery address Home   Number of medications in delivery 1   Medication(s) being filled and delivered Relugolix (ORGOVYX)   Doses left of specialty medications NEW START   Copay verified? Yes   Copay amount 0$ w/ gavin   Copay form of payment No copayment ($0)   Ship Date 1/29   Delivery Date Selection 01/30/25   Signature Required No                   Follow-up: 30 day(s)     Reed Posada, Pharmacy Technician  Specialty Pharmacy Technician

## 2025-02-10 ENCOUNTER — TELEPHONE (OUTPATIENT)
Dept: ONCOLOGY | Facility: CLINIC | Age: 85
End: 2025-02-10
Payer: MEDICARE

## 2025-02-10 NOTE — TELEPHONE ENCOUNTER
Caller: Phillip Bridges    Relationship: Self    Best call back number: [phone]    What is the best time to reach you: ANY    Who are you requesting to speak with (clinical staff, provider,  specific staff member): CLINICAL       What was the call regarding: PHILLIP IS NEEDING TO SPEAK TO RADHAMES WALKER REGARDING MEDICATION relugolix (ORGOVYX) 120 MG tablet   HE STATES THAT IT IS MAKING HIM DIZZY AND HIS BLOOD PRESSURE IS A LITTLE ELEVATED    PLEASE CALL TO ADVISE       
Return call to Phillip.  Phillip reports slight increased dizziness yesterday.  Phillip believes this to be related to Relugolix.  Suggested Phillip continue Relugolix and if symptoms worsen or persist to let our clinic know.  Phillip states understood  
Electrodesiccation Text: The wound bed was treated with electrodesiccation after the biopsy was performed.
Depth Of Biopsy: dermis
Bill For Surgical Tray: no
Notification Instructions: Patient will be notified of biopsy results. However, patient instructed to call the office if not contacted within 2 weeks.
Anesthesia Type: 1% Xylocaine with [id number] epinephrine and sodium bicarbonate
Size Of Lesion In Cm: 0
Lab Facility: 3
Wound Care: Petrolatum
Biopsy Type: H and E
Electrodesiccation And Curettage Text: The wound bed was treated with electrodesiccation and curettage after the biopsy was performed.
Consent: Written consent was obtained and risks were reviewed including but not limited to scarring, infection, bleeding, scabbing, incomplete removal, nerve damage and allergy to anesthesia.
Anesthesia Volume In Cc (Will Not Render If 0): 0.5
Type Of Destruction Used: Curettage
Hemostasis: Aluminum Chloride
Dressing: bandage
Was A Bandage Applied: Yes
Silver Nitrate Text: The wound bed was treated with silver nitrate after the biopsy was performed.
Post-Care Instructions: I reviewed with the patient in detail post-care instructions. Patient is to keep the biopsy site dry overnight, and then apply bacitracin twice daily until healed. Patient may apply hydrogen peroxide soaks to remove any crusting.
Cryotherapy Text: The wound bed was treated with cryotherapy after the biopsy was performed.
Biopsy Method: Dermablade
Lab: -10
Curettage Text: The wound bed was treated with curettage after the biopsy was performed.
Detail Level: Detailed
Billing Type: Third-Party Bill

## 2025-02-19 ENCOUNTER — TELEPHONE (OUTPATIENT)
Dept: ONCOLOGY | Facility: CLINIC | Age: 85
End: 2025-02-19

## 2025-02-19 NOTE — TELEPHONE ENCOUNTER
Caller: Phillip Bridges    Relationship: Self    Best call back number: [phone]     What is the best time to reach you: ANYTIME    Who are you requesting to speak with (clinical staff, provider,  specific staff member): [facility] - [facility]    What was the call regarding:PATIENT HAS A FORM FOR A MEL AND NEEDS TO SPEAK WITH [facility] IN [facility]ARM[facility]Y ABOUT THIS.     PLEASE ADVISE.

## 2025-02-21 ENCOUNTER — SPECIALTY PHARMACY (OUTPATIENT)
Dept: ONCOLOGY | Facility: HOSPITAL | Age: 85
End: 2025-02-21
Payer: MEDICARE

## 2025-02-21 NOTE — PROGRESS NOTES
Specialty Pharmacy Patient Management Program  Refill Outreach     Phillip was contacted today regarding refills of their medication(s).    Refill Questions      Flowsheet Row Most Recent Value   Changes to allergies? No   Changes to medications? Yes  [Ativan for BP]   New conditions or infections since last clinic visit No   Unplanned office visit, urgent care, ED, or hospital admission in the last 4 weeks  No   How does patient/caregiver feel medication is working? Good   Financial problems or insurance changes  No   Since the previous refill, were any specialty medication doses or scheduled injections missed or delayed?  No   Does this patient require a clinical escalation to a pharmacist? No            Delivery Questions      Flowsheet Row Most Recent Value   Delivery method UPS   Delivery address verified with patient/caregiver? Yes   Delivery address Home   Other address preferred n/a   Number of medications in delivery 1   Medication(s) being filled and delivered Relugolix (ORGOVYX)   Doses left of specialty medications 10 days left   Copay verified? Yes   Copay amount 0$   Copay form of payment No copayment ($0)   Delivery Date Selection 02/25/25   Signature Required No                 Follow-up: 30 day(s)     Reed Posada, Pharmacy Technician  2/21/2025  12:19 EST

## 2025-02-28 ENCOUNTER — TELEPHONE (OUTPATIENT)
Dept: ONCOLOGY | Facility: CLINIC | Age: 85
End: 2025-02-28
Payer: MEDICARE

## 2025-02-28 ENCOUNTER — LAB (OUTPATIENT)
Dept: LAB | Facility: HOSPITAL | Age: 85
End: 2025-02-28
Payer: MEDICARE

## 2025-02-28 DIAGNOSIS — C61 PROSTATE CANCER: Primary | ICD-10-CM

## 2025-02-28 DIAGNOSIS — C61 PROSTATE CANCER: ICD-10-CM

## 2025-02-28 LAB
ALBUMIN SERPL-MCNC: 4.5 G/DL (ref 3.5–5.2)
ALBUMIN/GLOB SERPL: 2.3 G/DL
ALP SERPL-CCNC: 71 U/L (ref 39–117)
ALT SERPL W P-5'-P-CCNC: <5 U/L (ref 1–41)
ANION GAP SERPL CALCULATED.3IONS-SCNC: 14 MMOL/L (ref 5–15)
AST SERPL-CCNC: 21 U/L (ref 1–40)
BASOPHILS # BLD AUTO: 0.04 10*3/MM3 (ref 0–0.2)
BASOPHILS NFR BLD AUTO: 0.5 % (ref 0–1.5)
BILIRUB SERPL-MCNC: 0.2 MG/DL (ref 0–1.2)
BUN SERPL-MCNC: 23 MG/DL (ref 8–23)
BUN/CREAT SERPL: 22.8 (ref 7–25)
CALCIUM SPEC-SCNC: 8.7 MG/DL (ref 8.6–10.5)
CHLORIDE SERPL-SCNC: 102 MMOL/L (ref 98–107)
CO2 SERPL-SCNC: 25 MMOL/L (ref 22–29)
CREAT SERPL-MCNC: 1.01 MG/DL (ref 0.76–1.27)
DEPRECATED RDW RBC AUTO: 42.5 FL (ref 37–54)
EGFRCR SERPLBLD CKD-EPI 2021: 73.8 ML/MIN/1.73
EOSINOPHIL # BLD AUTO: 0.5 10*3/MM3 (ref 0–0.4)
EOSINOPHIL NFR BLD AUTO: 6.5 % (ref 0.3–6.2)
ERYTHROCYTE [DISTWIDTH] IN BLOOD BY AUTOMATED COUNT: 12.8 % (ref 12.3–15.4)
GLOBULIN UR ELPH-MCNC: 2 GM/DL
GLUCOSE SERPL-MCNC: 111 MG/DL (ref 65–99)
HCT VFR BLD AUTO: 37.6 % (ref 37.5–51)
HGB BLD-MCNC: 12.7 G/DL (ref 13–17.7)
IMM GRANULOCYTES # BLD AUTO: 0.03 10*3/MM3 (ref 0–0.05)
IMM GRANULOCYTES NFR BLD AUTO: 0.4 % (ref 0–0.5)
LYMPHOCYTES # BLD AUTO: 1.75 10*3/MM3 (ref 0.7–3.1)
LYMPHOCYTES NFR BLD AUTO: 22.7 % (ref 19.6–45.3)
MCH RBC QN AUTO: 30.5 PG (ref 26.6–33)
MCHC RBC AUTO-ENTMCNC: 33.8 G/DL (ref 31.5–35.7)
MCV RBC AUTO: 90.4 FL (ref 79–97)
MONOCYTES # BLD AUTO: 0.51 10*3/MM3 (ref 0.1–0.9)
MONOCYTES NFR BLD AUTO: 6.6 % (ref 5–12)
NEUTROPHILS NFR BLD AUTO: 4.88 10*3/MM3 (ref 1.7–7)
NEUTROPHILS NFR BLD AUTO: 63.3 % (ref 42.7–76)
NRBC BLD AUTO-RTO: 0 /100 WBC (ref 0–0.2)
PLATELET # BLD AUTO: 224 10*3/MM3 (ref 140–450)
PMV BLD AUTO: 9.9 FL (ref 6–12)
POTASSIUM SERPL-SCNC: 4.4 MMOL/L (ref 3.5–5.2)
PROT SERPL-MCNC: 6.5 G/DL (ref 6–8.5)
PSA SERPL-MCNC: 0.41 NG/ML (ref 0–4)
RBC # BLD AUTO: 4.16 10*6/MM3 (ref 4.14–5.8)
SODIUM SERPL-SCNC: 141 MMOL/L (ref 136–145)
WBC NRBC COR # BLD AUTO: 7.71 10*3/MM3 (ref 3.4–10.8)

## 2025-02-28 PROCEDURE — 80053 COMPREHEN METABOLIC PANEL: CPT

## 2025-02-28 PROCEDURE — 84153 ASSAY OF PSA TOTAL: CPT

## 2025-02-28 PROCEDURE — 36415 COLL VENOUS BLD VENIPUNCTURE: CPT

## 2025-02-28 PROCEDURE — 85025 COMPLETE CBC W/AUTO DIFF WBC: CPT

## 2025-02-28 NOTE — TELEPHONE ENCOUNTER
Return call to Phillip.  Advised lab orders have been placed and he may have them collected at any time.

## 2025-02-28 NOTE — TELEPHONE ENCOUNTER
Caller: Phillip Bridges    Relationship: Self    Best call back number:   Telephone Information:   Mobile [phone]       What was the call regarding: PATIENT HAS APPT ON 3/4/2025.  DOES HE NEED LABS? IF SO ADD ORDERS. CALL TO ADVISE  WOULD LIKE TO HAVE DONE TODAY 2/28/2028.

## 2025-03-04 ENCOUNTER — SPECIALTY PHARMACY (OUTPATIENT)
Facility: HOSPITAL | Age: 85
End: 2025-03-04
Payer: MEDICARE

## 2025-03-04 ENCOUNTER — OFFICE VISIT (OUTPATIENT)
Dept: ONCOLOGY | Facility: CLINIC | Age: 85
End: 2025-03-04
Payer: MEDICARE

## 2025-03-04 VITALS
BODY MASS INDEX: 26.74 KG/M2 | RESPIRATION RATE: 16 BRPM | DIASTOLIC BLOOD PRESSURE: 80 MMHG | HEIGHT: 71 IN | OXYGEN SATURATION: 97 % | SYSTOLIC BLOOD PRESSURE: 150 MMHG | WEIGHT: 191 LBS | HEART RATE: 76 BPM | TEMPERATURE: 97.1 F

## 2025-03-04 DIAGNOSIS — C61 PROSTATE CANCER: Primary | ICD-10-CM

## 2025-03-04 PROCEDURE — 1126F AMNT PAIN NOTED NONE PRSNT: CPT | Performed by: INTERNAL MEDICINE

## 2025-03-04 PROCEDURE — 99214 OFFICE O/P EST MOD 30 MIN: CPT | Performed by: INTERNAL MEDICINE

## 2025-03-04 NOTE — PROGRESS NOTES
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 male past medical history of seasonal allergies, hypothyroidism, hypertension, prostate cancer who presents today for evaluation of prostate cancer. The patient is accompanied by their wife who contributes to the history of their care.  Patient was initially diagnosed in  and underwent definitive radiation therapy.  Pathology results not available to me from his initial diagnosis.  He was on observation with normal PSAs until  when his PSA started to slowly creep up over the past 6 years until eventually reaching a range of 5.48 in 2022.  He was started on Eligard as well as Casodex.  Had significant side effects with Eligard in terms of fatigue, hot flashes, swelling, emotional changes.  He was seen by Dr. Scales who ordered a PSA and a PSMA PET/CT.  PSA in May 2023 was <0.014 and his PSMA PET/CT did not have any disease noted.  He is currently doing well with no major toxicities.     Interval History:  Presents to clinic for follow-up.  Started on relugolix at the end of 2025.  Having fatigue with the medication but overall tolerable.  Denies any bone pain or discomfort.  Denies any hot flashes    Oncology History:    Oncology/Hematology History   Prostate cancer   5/15/2023 Initial Diagnosis    Prostate cancer     2025 -  Chemotherapy    OP SUPPORTIVE PROSTATE Relugolix         Subjective      Review of Systems:   Constitutional: Negative for fevers, chills, or weight loss  Eyes: Negative for blurred vision or discharge         Ear/Nose/Throat: Negative for difficulty swallowing, sore throat, LAD                                                       Respiratory: Negative for cough, SOA, wheezing                                                                                         Cardiovascular: Negative for chest pain or palpitations                                                                  Gastrointestinal: Negative for nausea, vomiting or diarrhea                                                                     Genitourinary: Negative for dysuria or hematuria                                                                                           Musculoskeletal: Negative for any joint pains or muscle aches                                                                        Neurologic: Negative for any weakness, headaches, dizziness                                                                         Hematologic: Negative for any easy bleeding or bruising                                                                                   Psychiatric: Negative for anxiety or depression                          Past Medical History/Past Surgical History/ Family History/ Social History: Reviewed by me and unchanged from my previous documentation done on January 2025.     Medications:     Current Outpatient Medications:     amLODIPine (NORVASC) 5 MG tablet, Take 1 tablet by mouth As Needed. (Patient taking differently: Take 0.5 tablets by mouth As Needed.), Disp: , Rfl:     aspirin 81 MG EC tablet, Take 1 tablet by mouth Daily., Disp: , Rfl:     clindamycin 1 % gel, Apply 1 Application topically to the appropriate area as directed 2 (Two) Times a Day., Disp: , Rfl:     clobetasol (CLOBEX) 0.05 % lotion, Apply 1 Application topically to the appropriate area as directed 2 (Two) Times a Day., Disp: , Rfl:     eszopiclone (LUNESTA) 1 MG tablet, As Needed., Disp: , Rfl:     fexofenadine (ALLEGRA) 60 MG tablet, Take 1 tablet by mouth Daily., Disp: , Rfl:     ketoconazole (NIZORAL) 2 % cream, Apply 1 Application topically to the appropriate area as directed Daily., Disp: , Rfl:     levothyroxine  "(SYNTHROID, LEVOTHROID) 75 MCG tablet, , Disp: , Rfl:     lidocaine (LIDODERM) 5 %, Place 1 patch on the skin as directed by provider Daily., Disp: , Rfl:     olmesartan (BENICAR) 5 MG tablet, Take 1 tablet by mouth Daily., Disp: , Rfl:     Olmesartan Medoxomil (BENICAR PO), Take 10 mg by mouth Daily., Disp: , Rfl:     relugolix (ORGOVYX) 120 MG tablet tablet, Take 3 tablets by mouth on Day 1 of Cycle 1 only, then take 1 tablet by mouth daily thereafter., Disp: 30 tablet, Rfl: 11    sertraline (ZOLOFT) 50 MG tablet, Take 1 tablet by mouth Daily., Disp: , Rfl:     Allergies:   No Known Allergies    Objective     Physical Exam:  Vital Signs:   Vitals:    03/04/25 1024   BP: 150/80   Pulse: 76   Resp: 16   Temp: 97.1 °F (36.2 °C)   TempSrc: Temporal   SpO2: 97%   Weight: 86.6 kg (191 lb)   Height: 180.3 cm (70.98\")   PainSc: 0-No pain     Pain Score    03/04/25 1024   PainSc: 0-No pain     ECOG Performance Status: 1    Constitutional: NAD, ECOG 1  Eyes: PERRLA, scleral anicteric  ENT: No LAD, no thyromegaly  Respiratory: CTAB, no wheezing, rales, rhonchi  Cardiovascular: RRR, no murmurs, pulses 2+ bilaterally  Abdomen: soft, NT/ND, no HSM  Musculoskeletal: strength 5/5 bilaterally, no c/c/e  Neurologic: A&O x 3, CN II-XII intact grossly    Results Review:   Lab on 02/28/2025   Component Date Value Ref Range Status    Glucose 02/28/2025 111 (H)  65 - 99 mg/dL Final    BUN 02/28/2025 23  8 - 23 mg/dL Final    Creatinine 02/28/2025 1.01  0.76 - 1.27 mg/dL Final    Sodium 02/28/2025 141  136 - 145 mmol/L Final    Potassium 02/28/2025 4.4  3.5 - 5.2 mmol/L Final    Specimen hemolyzed.  Result may be falsely elevated.    Chloride 02/28/2025 102  98 - 107 mmol/L Final    CO2 02/28/2025 25.0  22.0 - 29.0 mmol/L Final    Calcium 02/28/2025 8.7  8.6 - 10.5 mg/dL Final    Total Protein 02/28/2025 6.5  6.0 - 8.5 g/dL Final    Albumin 02/28/2025 4.5  3.5 - 5.2 g/dL Final    ALT (SGPT) 02/28/2025 <5  1 - 41 U/L Final    Specimen " hemolyzed.  Result may  be falsely elevated.    AST (SGOT) 02/28/2025 21  1 - 40 U/L Final    Alkaline Phosphatase 02/28/2025 71  39 - 117 U/L Final    Total Bilirubin 02/28/2025 0.2  0.0 - 1.2 mg/dL Final    Globulin 02/28/2025 2.0  gm/dL Final    Calculated Result    A/G Ratio 02/28/2025 2.3  g/dL Final    BUN/Creatinine Ratio 02/28/2025 22.8  7.0 - 25.0 Final    Anion Gap 02/28/2025 14.0  5.0 - 15.0 mmol/L Final    eGFR 02/28/2025 73.8  >60.0 mL/min/1.73 Final    PSA 02/28/2025 0.411  0.000 - 4.000 ng/mL Final    WBC 02/28/2025 7.71  3.40 - 10.80 10*3/mm3 Final    RBC 02/28/2025 4.16  4.14 - 5.80 10*6/mm3 Final    Hemoglobin 02/28/2025 12.7 (L)  13.0 - 17.7 g/dL Final    Hematocrit 02/28/2025 37.6  37.5 - 51.0 % Final    MCV 02/28/2025 90.4  79.0 - 97.0 fL Final    MCH 02/28/2025 30.5  26.6 - 33.0 pg Final    MCHC 02/28/2025 33.8  31.5 - 35.7 g/dL Final    RDW 02/28/2025 12.8  12.3 - 15.4 % Final    RDW-SD 02/28/2025 42.5  37.0 - 54.0 fl Final    MPV 02/28/2025 9.9  6.0 - 12.0 fL Final    Platelets 02/28/2025 224  140 - 450 10*3/mm3 Final    Neutrophil % 02/28/2025 63.3  42.7 - 76.0 % Final    Lymphocyte % 02/28/2025 22.7  19.6 - 45.3 % Final    Monocyte % 02/28/2025 6.6  5.0 - 12.0 % Final    Eosinophil % 02/28/2025 6.5 (H)  0.3 - 6.2 % Final    Basophil % 02/28/2025 0.5  0.0 - 1.5 % Final    Immature Grans % 02/28/2025 0.4  0.0 - 0.5 % Final    Neutrophils, Absolute 02/28/2025 4.88  1.70 - 7.00 10*3/mm3 Final    Lymphocytes, Absolute 02/28/2025 1.75  0.70 - 3.10 10*3/mm3 Final    Monocytes, Absolute 02/28/2025 0.51  0.10 - 0.90 10*3/mm3 Final    Eosinophils, Absolute 02/28/2025 0.50 (H)  0.00 - 0.40 10*3/mm3 Final    Basophils, Absolute 02/28/2025 0.04  0.00 - 0.20 10*3/mm3 Final    Immature Grans, Absolute 02/28/2025 0.03  0.00 - 0.05 10*3/mm3 Final    nRBC 02/28/2025 0.0  0.0 - 0.2 /100 WBC Final       No results found.    Assessment / Plan      Assessment/Plan:   1. Prostate cancer (Primary)  -Initially  diagnosed in 2008 and status post definitive radiation treatment  -PSA slowly rising from 2007 from 0.35 until October 20 22-5.48  -Castrate sensitive disease and likely low risk disease based off the amount of time for his PSA to increase  -Started on Eligard and Casodex in October 2022 with significant toxicities related to the Eligard  -PSA in May 2023 <0.014 and PSMA PET/CT with no disease noted  -PSA in August 2023 < 0.014  -PSA in November 2023 <0.014  -PSA in February 2024 <0.014  -PSA in May 2024 0.077  -PSA in August 2024 0.522  -PSMA PET/CT in September 2024 without evidence of metastatic disease  -PSA 1.9 in November 2024  -PSMA PET/CT in December 2024 notable for elevated uptake in the periphery of the right prostate gland  -Was seen by Dr. Tran with radiation oncology and deemed to be high risk given his previous radiation therapy in the area  -Started on relugolix in January 2025.  Tolerating well.  PSA decreasing to 0.411 in February 2025.  Will plan to continue treatment today  -Pending response over the next couple months, will likely transition to intermittent ADT given his history of toxicity    2.  Pituitary macroadenoma  -Follows with neurosurgery at   -Most recent MRI in June 2023 showing stable disease with plans to repeat in 1 year         Follow Up:   Follow-up in 1 month     Stanford Fonseca MD  Hematology and Oncology     Please note that portions of this note may have been completed with a voice recognition program. Efforts were made to edit the dictations, but occasionally words are mistranscribed.

## 2025-03-14 ENCOUNTER — TELEPHONE (OUTPATIENT)
Dept: ONCOLOGY | Facility: CLINIC | Age: 85
End: 2025-03-14

## 2025-03-14 ENCOUNTER — TELEPHONE (OUTPATIENT)
Dept: ONCOLOGY | Facility: CLINIC | Age: 85
End: 2025-03-14
Payer: MEDICARE

## 2025-03-14 NOTE — TELEPHONE ENCOUNTER
Return call to Phillip.  Phillip concerned about his fatigue he has been feeling since starting Relugolix.  He states he is feeling better this afternoon.  Advised that Dr Fonseca suggests that if he is able , he should try to continue on current dosage as his PSA is responding well.  Phillip states he will continue to take same dose and stay hydrated.  Will follow up on 04/01

## 2025-03-14 NOTE — TELEPHONE ENCOUNTER
Hub staff attempted to follow warm transfer process and was unsuccessful     Caller: Phillip Bridges    Relationship to patient: Self    Best call back number: [phone]    Patient is needing: PATIENT RETURNING CALL TO [facility] REGARDING PREVIOUS MSG ABOUT FATIGUE.

## 2025-03-14 NOTE — TELEPHONE ENCOUNTER
Caller: Phillip Bridges    Relationship: Self    Best call back number: [phone]    Which medication are you concerned about: ONE PRESCRIBED FOR PROSTATE CANCER     Who prescribed you this medication: DR FINCH     When did you start taking this medication: LAST TWO OR THREE WEEKS     What are your concerns:  IS CAUSING TO BE REALLY REALLY FATIGUED AND TIRED , WANTING TO KNOW  IF THERE IS ANYTHING CAN DO FOR THIS         How long have you had these concerns: SINCE STARTED HAS BEEN GOING ON , BUT HAS GOTTEN A LITTLE BIT WORSE

## 2025-03-21 ENCOUNTER — LAB (OUTPATIENT)
Dept: LAB | Facility: HOSPITAL | Age: 85
End: 2025-03-21
Payer: MEDICARE

## 2025-03-21 DIAGNOSIS — C61 PROSTATE CANCER: ICD-10-CM

## 2025-03-21 LAB
ALBUMIN SERPL-MCNC: 4.8 G/DL (ref 3.5–5.2)
ALBUMIN/GLOB SERPL: 2.2 G/DL
ALP SERPL-CCNC: 77 U/L (ref 39–117)
ALT SERPL W P-5'-P-CCNC: 23 U/L (ref 1–41)
ANION GAP SERPL CALCULATED.3IONS-SCNC: 14 MMOL/L (ref 5–15)
AST SERPL-CCNC: 22 U/L (ref 1–40)
BASOPHILS # BLD AUTO: 0.03 10*3/MM3 (ref 0–0.2)
BASOPHILS NFR BLD AUTO: 0.4 % (ref 0–1.5)
BILIRUB SERPL-MCNC: 0.3 MG/DL (ref 0–1.2)
BUN SERPL-MCNC: 22 MG/DL (ref 8–23)
BUN/CREAT SERPL: 27.8 (ref 7–25)
CALCIUM SPEC-SCNC: 9.6 MG/DL (ref 8.6–10.5)
CHLORIDE SERPL-SCNC: 104 MMOL/L (ref 98–107)
CO2 SERPL-SCNC: 25 MMOL/L (ref 22–29)
CREAT SERPL-MCNC: 0.79 MG/DL (ref 0.76–1.27)
DEPRECATED RDW RBC AUTO: 43.5 FL (ref 37–54)
EGFRCR SERPLBLD CKD-EPI 2021: 88.1 ML/MIN/1.73
EOSINOPHIL # BLD AUTO: 0.47 10*3/MM3 (ref 0–0.4)
EOSINOPHIL NFR BLD AUTO: 6.3 % (ref 0.3–6.2)
ERYTHROCYTE [DISTWIDTH] IN BLOOD BY AUTOMATED COUNT: 13 % (ref 12.3–15.4)
GLOBULIN UR ELPH-MCNC: 2.2 GM/DL
GLUCOSE SERPL-MCNC: 101 MG/DL (ref 65–99)
HCT VFR BLD AUTO: 39.4 % (ref 37.5–51)
HGB BLD-MCNC: 13.2 G/DL (ref 13–17.7)
IMM GRANULOCYTES # BLD AUTO: 0.02 10*3/MM3 (ref 0–0.05)
IMM GRANULOCYTES NFR BLD AUTO: 0.3 % (ref 0–0.5)
LYMPHOCYTES # BLD AUTO: 1.74 10*3/MM3 (ref 0.7–3.1)
LYMPHOCYTES NFR BLD AUTO: 23.5 % (ref 19.6–45.3)
MCH RBC QN AUTO: 30.8 PG (ref 26.6–33)
MCHC RBC AUTO-ENTMCNC: 33.5 G/DL (ref 31.5–35.7)
MCV RBC AUTO: 91.8 FL (ref 79–97)
MONOCYTES # BLD AUTO: 0.58 10*3/MM3 (ref 0.1–0.9)
MONOCYTES NFR BLD AUTO: 7.8 % (ref 5–12)
NEUTROPHILS NFR BLD AUTO: 4.57 10*3/MM3 (ref 1.7–7)
NEUTROPHILS NFR BLD AUTO: 61.7 % (ref 42.7–76)
NRBC BLD AUTO-RTO: 0 /100 WBC (ref 0–0.2)
PLATELET # BLD AUTO: 230 10*3/MM3 (ref 140–450)
PMV BLD AUTO: 9.8 FL (ref 6–12)
POTASSIUM SERPL-SCNC: 4.5 MMOL/L (ref 3.5–5.2)
PROT SERPL-MCNC: 7 G/DL (ref 6–8.5)
RBC # BLD AUTO: 4.29 10*6/MM3 (ref 4.14–5.8)
SODIUM SERPL-SCNC: 143 MMOL/L (ref 136–145)
WBC NRBC COR # BLD AUTO: 7.41 10*3/MM3 (ref 3.4–10.8)

## 2025-03-21 PROCEDURE — 84153 ASSAY OF PSA TOTAL: CPT

## 2025-03-21 PROCEDURE — 80053 COMPREHEN METABOLIC PANEL: CPT

## 2025-03-21 PROCEDURE — 36415 COLL VENOUS BLD VENIPUNCTURE: CPT

## 2025-03-21 PROCEDURE — 85025 COMPLETE CBC W/AUTO DIFF WBC: CPT

## 2025-03-22 LAB — PSA SERPL-MCNC: 0.14 NG/ML (ref 0–4)

## 2025-04-01 ENCOUNTER — SPECIALTY PHARMACY (OUTPATIENT)
Dept: ONCOLOGY | Facility: HOSPITAL | Age: 85
End: 2025-04-01
Payer: MEDICARE

## 2025-04-01 ENCOUNTER — OFFICE VISIT (OUTPATIENT)
Dept: ONCOLOGY | Facility: CLINIC | Age: 85
End: 2025-04-01
Payer: MEDICARE

## 2025-04-01 VITALS
HEIGHT: 71 IN | DIASTOLIC BLOOD PRESSURE: 83 MMHG | HEART RATE: 82 BPM | WEIGHT: 194 LBS | RESPIRATION RATE: 16 BRPM | BODY MASS INDEX: 27.16 KG/M2 | SYSTOLIC BLOOD PRESSURE: 157 MMHG | OXYGEN SATURATION: 98 % | TEMPERATURE: 97.1 F

## 2025-04-01 DIAGNOSIS — C61 PROSTATE CANCER: Primary | ICD-10-CM

## 2025-04-01 RX ORDER — LIDOCAINE 50 MG/G
1 PATCH TOPICAL EVERY 24 HOURS
Qty: 30 PATCH | Refills: 0 | Status: SHIPPED | OUTPATIENT
Start: 2025-04-01

## 2025-04-01 NOTE — PROGRESS NOTES
The team is aware the patient will hold treatment with relugolix for 3 months due to fatigue. I have opened a Medication Therapy Problem (MTP) in order to track the patient holding treatment with relugolix. The patient has a follow-up appointment with Dr. Fonseca on 7/1/25.    Cassidy Jeong, PharmD  Clinic Oncology Pharmacy Specialist  [phone]

## 2025-04-01 NOTE — PROGRESS NOTES
Specialty Pharmacy Patient Management Program  Refill Outreach     Phillip was contacted today regarding refills of their medication(s). On 4/1/2025, Dr. Fonseca wants Phillip to hold his medication for 3 months due to fatigue. Will continue to follow.     Refill Questions      Flowsheet Row Most Recent Value   Changes to allergies? No   Changes to medications? Yes  [Holding Orgovyx for 3 months (from 4/1/2025)]   New conditions or infections since last clinic visit No   Unplanned office visit, urgent care, ED, or hospital admission in the last 4 weeks  No   How does patient/caregiver feel medication is working? Fair   Financial problems or insurance changes  No   Since the previous refill, were any specialty medication doses or scheduled injections missed or delayed?  No   Does this patient require a clinical escalation to a pharmacist? Yes            Follow-up: 30 day(s)     Reed Posada, Pharmacy Technician  4/1/2025  10:30 EDT

## 2025-04-01 NOTE — PROGRESS NOTES
Follow Up Office Visit      Date: 2025     Patient Name: Phillip Bridges  MRN: [id number]  : 1940  Referring Physician: Los Scales     Chief Complaint: Follow-up for castrate sensitive prostate cancer     History of Present Illness: Phillip Bridges is a pleasant 82 y.o. male past medical history of seasonal allergies, hypothyroidism, hypertension, prostate cancer who presents today for evaluation of prostate cancer. The patient is accompanied by their wife who contributes to the history of their care.  Patient was initially diagnosed in  and underwent definitive radiation therapy.  Pathology results not available to me from his initial diagnosis.  He was on observation with normal PSAs until  when his PSA started to slowly creep up over the past 6 years until eventually reaching a range of 5.48 in 2022.  He was started on Eligard as well as Casodex.  Had significant side effects with Eligard in terms of fatigue, hot flashes, swelling, emotional changes.  He was seen by Dr. Scales who ordered a PSA and a PSMA PET/CT.  PSA in May 2023 was <0.014 and his PSMA PET/CT did not have any disease noted.  He is currently doing well with no major toxicities.     Interval History:  Presents to clinic for follow-up.  Started on relugolix at the end of 2025.  Continues to have worsening fatigue with the medication.  Some hot flashes.  Notes his quality of life as significantly diminished since starting the medication    Oncology History:    Oncology/Hematology History   Prostate cancer   5/15/2023 Initial Diagnosis    Prostate cancer     2025 -  Chemotherapy    OP SUPPORTIVE PROSTATE Relugolix         Subjective      Review of Systems:   Constitutional: Negative for fevers, chills, or weight loss  Eyes: Negative for blurred vision or discharge         Ear/Nose/Throat: Negative for difficulty swallowing, sore throat, LAD                                                        Respiratory: Negative for cough, SOA, wheezing                                                                                        Cardiovascular: Negative for chest pain or palpitations                                                                  Gastrointestinal: Negative for nausea, vomiting or diarrhea                                                                     Genitourinary: Negative for dysuria or hematuria                                                                                           Musculoskeletal: Negative for any joint pains or muscle aches                                                                        Neurologic: Negative for any weakness, headaches, dizziness                                                                         Hematologic: Negative for any easy bleeding or bruising                                                                                   Psychiatric: Negative for anxiety or depression                          Past Medical History/Past Surgical History/ Family History/ Social History: Reviewed by me and unchanged from my previous documentation done on February 2025.     Medications:     Current Outpatient Medications:     amLODIPine (NORVASC) 5 MG tablet, Take 1 tablet by mouth As Needed. (Patient taking differently: Take 0.5 tablets by mouth As Needed.), Disp: , Rfl:     aspirin 81 MG EC tablet, Take 1 tablet by mouth Daily., Disp: , Rfl:     clindamycin 1 % gel, Apply 1 Application topically to the appropriate area as directed 2 (Two) Times a Day., Disp: , Rfl:     clobetasol (CLOBEX) 0.05 % lotion, Apply 1 Application topically to the appropriate area as directed 2 (Two) Times a Day., Disp: , Rfl:     eszopiclone (LUNESTA) 1 MG tablet, As Needed., Disp: , Rfl:     fexofenadine (ALLEGRA) 60 MG tablet, Take 1 tablet by mouth Daily., Disp: , Rfl:     ketoconazole (NIZORAL) 2 % cream, Apply 1 Application topically to the appropriate area as  "directed Daily., Disp: , Rfl:     levothyroxine (SYNTHROID, LEVOTHROID) 75 MCG tablet, , Disp: , Rfl:     lidocaine (LIDODERM) 5 %, Place 1 patch on the skin as directed by provider Daily., Disp: 30 patch, Rfl: 0    olmesartan (BENICAR) 5 MG tablet, Take 1 tablet by mouth Daily., Disp: , Rfl:     Olmesartan Medoxomil (BENICAR PO), Take 10 mg by mouth Daily., Disp: , Rfl:     relugolix (ORGOVYX) 120 MG tablet tablet, Take 3 tablets by mouth on Day 1 of Cycle 1 only, then take 1 tablet by mouth daily thereafter., Disp: 30 tablet, Rfl: 11    sertraline (ZOLOFT) 50 MG tablet, Take 1 tablet by mouth Daily., Disp: , Rfl:     Allergies:   No Known Allergies    Objective     Physical Exam:  Vital Signs:   Vitals:    04/01/25 0945   BP: 157/83   Pulse: 82   Resp: 16   Temp: 97.1 °F (36.2 °C)   TempSrc: Temporal   SpO2: 98%   Weight: 88 kg (194 lb)   Height: 180.3 cm (70.98\")   PainSc: 4    PainLoc: Shoulder     Pain Score    04/01/25 0945   PainSc: 4    PainLoc: Shoulder     ECOG Performance Status: 1 - Symptomatic but completely ambulatory    Constitutional: NAD, ECOG 1  Eyes: PERRLA, scleral anicteric  ENT: No LAD, no thyromegaly  Respiratory: CTAB, no wheezing, rales, rhonchi  Cardiovascular: RRR, no murmurs, pulses 2+ bilaterally  Abdomen: soft, NT/ND, no HSM  Musculoskeletal: strength 5/5 bilaterally, no c/c/e  Neurologic: A&O x 3, CN II-XII intact grossly    Results Review:   Lab on 03/21/2025   Component Date Value Ref Range Status    Glucose 03/21/2025 101 (H)  65 - 99 mg/dL Final    BUN 03/21/2025 22  8 - 23 mg/dL Final    Creatinine 03/21/2025 0.79  0.76 - 1.27 mg/dL Final    Sodium 03/21/2025 143  136 - 145 mmol/L Final    Potassium 03/21/2025 4.5  3.5 - 5.2 mmol/L Final    Slight hemolysis detected by analyzer. Result may be falsely elevated.    Chloride 03/21/2025 104  98 - 107 mmol/L Final    CO2 03/21/2025 25.0  22.0 - 29.0 mmol/L Final    Calcium 03/21/2025 9.6  8.6 - 10.5 mg/dL Final    Total Protein " 03/21/2025 7.0  6.0 - 8.5 g/dL Final    Albumin 03/21/2025 4.8  3.5 - 5.2 g/dL Final    ALT (SGPT) 03/21/2025 23  1 - 41 U/L Final    AST (SGOT) 03/21/2025 22  1 - 40 U/L Final    Alkaline Phosphatase 03/21/2025 77  39 - 117 U/L Final    Total Bilirubin 03/21/2025 0.3  0.0 - 1.2 mg/dL Final    Globulin 03/21/2025 2.2  gm/dL Final    Calculated Result    A/G Ratio 03/21/2025 2.2  g/dL Final    BUN/Creatinine Ratio 03/21/2025 27.8 (H)  7.0 - 25.0 Final    Anion Gap 03/21/2025 14.0  5.0 - 15.0 mmol/L Final    eGFR 03/21/2025 88.1  >60.0 mL/min/1.73 Final    PSA 03/21/2025 0.137  0.000 - 4.000 ng/mL Final    WBC 03/21/2025 7.41  3.40 - 10.80 10*3/mm3 Final    RBC 03/21/2025 4.29  4.14 - 5.80 10*6/mm3 Final    Hemoglobin 03/21/2025 13.2  13.0 - 17.7 g/dL Final    Hematocrit 03/21/2025 39.4  37.5 - 51.0 % Final    MCV 03/21/2025 91.8  79.0 - 97.0 fL Final    MCH 03/21/2025 30.8  26.6 - 33.0 pg Final    MCHC 03/21/2025 33.5  31.5 - 35.7 g/dL Final    RDW 03/21/2025 13.0  12.3 - 15.4 % Final    RDW-SD 03/21/2025 43.5  37.0 - 54.0 fl Final    MPV 03/21/2025 9.8  6.0 - 12.0 fL Final    Platelets 03/21/2025 230  140 - 450 10*3/mm3 Final    Neutrophil % 03/21/2025 61.7  42.7 - 76.0 % Final    Lymphocyte % 03/21/2025 23.5  19.6 - 45.3 % Final    Monocyte % 03/21/2025 7.8  5.0 - 12.0 % Final    Eosinophil % 03/21/2025 6.3 (H)  0.3 - 6.2 % Final    Basophil % 03/21/2025 0.4  0.0 - 1.5 % Final    Immature Grans % 03/21/2025 0.3  0.0 - 0.5 % Final    Neutrophils, Absolute 03/21/2025 4.57  1.70 - 7.00 10*3/mm3 Final    Lymphocytes, Absolute 03/21/2025 1.74  0.70 - 3.10 10*3/mm3 Final    Monocytes, Absolute 03/21/2025 0.58  0.10 - 0.90 10*3/mm3 Final    Eosinophils, Absolute 03/21/2025 0.47 (H)  0.00 - 0.40 10*3/mm3 Final    Basophils, Absolute 03/21/2025 0.03  0.00 - 0.20 10*3/mm3 Final    Immature Grans, Absolute 03/21/2025 0.02  0.00 - 0.05 10*3/mm3 Final    nRBC 03/21/2025 0.0  0.0 - 0.2 /100 WBC Final       No results  found.    Assessment / Plan      Assessment/Plan:   1. Prostate cancer (Primary)  -Initially diagnosed in 2008 and status post definitive radiation treatment  -PSA slowly rising from 2007 from 0.35 until October 20 22-5.48  -Castrate sensitive disease and likely low risk disease based off the amount of time for his PSA to increase  -Started on Eligard and Casodex in October 2022 with significant toxicities related to the Eligard  -PSA in May 2023 <0.014 and PSMA PET/CT with no disease noted  -PSA in August 2023 < 0.014  -PSA in November 2023 <0.014  -PSA in February 2024 <0.014  -PSA in May 2024 0.077  -PSA in August 2024 0.522  -PSMA PET/CT in September 2024 without evidence of metastatic disease  -PSA 1.9 in November 2024  -PSMA PET/CT in December 2024 notable for elevated uptake in the periphery of the right prostate gland  -Was seen by Dr. Tran with radiation oncology and deemed to be high risk given his previous radiation therapy in the area  -Started on relugolix in January 2025.     -PSA decreasing to 0.411 in February 2025.    -PSA 0.137 in March 2025  -Patient would like to hold relugolix for the next 3 months which I am agreeable with.  Plan for repeat labs in 3 months.  Ordered today     2.  Pituitary macroadenoma  -Follows with neurosurgery at   -Most recent MRI in June 2023 showing stable disease with plans to repeat in 1 year         Follow Up:   Follow-up in 3 months     Stanford Fonseca MD  Hematology and Oncology     Please note that portions of this note may have been completed with a voice recognition program. Efforts were made to edit the dictations, but occasionally words are mistranscribed.

## 2025-04-14 ENCOUNTER — TELEPHONE (OUTPATIENT)
Dept: ONCOLOGY | Facility: CLINIC | Age: 85
End: 2025-04-14
Payer: MEDICARE

## 2025-04-14 NOTE — TELEPHONE ENCOUNTER
Caller: Phillip Bridges    Relationship: Self    Best call back number: [phone]    What is the best time to reach you: ANYTIME    Who are you requesting to speak with (clinical staff, provider,  specific staff member): CLINICAL    What was the call regarding: REQUESTING A CALL BACK IN REGARDS TO HIS MEDICATION RELUGOLIX HE HAS BEEN OFF THE MEDICATION FOR 2 WEEKS AND STILL FEELS BAD.    PLEASE ADVISE

## 2025-04-14 NOTE — TELEPHONE ENCOUNTER
I called patient back and told him that it may take longer than two weeks for him to feel any differently.  He agreed and said he will call back if after another couple of weeks, he cannot feel any difference in symptoms.

## 2025-05-01 ENCOUNTER — TELEPHONE (OUTPATIENT)
Dept: ONCOLOGY | Facility: CLINIC | Age: 85
End: 2025-05-01
Payer: MEDICARE

## 2025-05-01 ENCOUNTER — SPECIALTY PHARMACY (OUTPATIENT)
Dept: ONCOLOGY | Facility: HOSPITAL | Age: 85
End: 2025-05-01
Payer: MEDICARE

## 2025-05-01 DIAGNOSIS — C61 PROSTATE CANCER: Primary | ICD-10-CM

## 2025-05-01 NOTE — PROGRESS NOTES
Specialty Pharmacy Patient Management Program  Refill Outreach     Phillip was contacted today regarding refills of their medication(s). Patient is currently holding Orgovyx.     Refill Questions      Flowsheet Row Most Recent Value   Changes to medications? Yes  [Patient is still holding Orgovyx]   Since the previous refill, were any specialty medication doses or scheduled injections missed or delayed?  Yes   If yes, please provide the amount Unsure   Why were doses missed? Patient is holding medication right now.   Does this patient require a clinical escalation to a pharmacist? Yes  [Patient currently holding Orgovyx]                          Ruth Sanz, Pharmacy Technician  5/1/2025  10:43 EDT

## 2025-05-01 NOTE — TELEPHONE ENCOUNTER
Left message for Phillip to return call.  Cassidy with pharmacy reports that Phillip states he is not feeling any better since being off the Relogulix for a month.  Have ordered labs. Waiting for return call

## 2025-05-01 NOTE — PROGRESS NOTES
The patient was instructed to hold Orgovyx for 3 months from 4/1/25 until his follow-up with Dr. Fonseca on 7/1/25 due to fatigue. The patient reported he is still not feeling good today on 5/1/25. I messaged Dr. Fonseca's nurse, Shazia, today with an update about the patient.    Cassidy Jeong, PharmD  [facility] Pharmacy Specialist  [phone]

## 2025-05-02 ENCOUNTER — TELEPHONE (OUTPATIENT)
Dept: ONCOLOGY | Facility: CLINIC | Age: 85
End: 2025-05-02
Payer: MEDICARE

## 2025-05-15 ENCOUNTER — TELEPHONE (OUTPATIENT)
Dept: ONCOLOGY | Facility: CLINIC | Age: 85
End: 2025-05-15
Payer: MEDICARE

## 2025-05-15 NOTE — TELEPHONE ENCOUNTER
Return call to Phillip.  Phillip wanting to know if he needs to keep his appointments with Dr Scales and Dr Fonseca.  Advised that since Dr Fonseca is prescribing the Relugolix that he will need to continue to follow up as scheduled.  Will need to call Dr Scales's office regarding follow ups with his office.  Phillip states understood

## 2025-05-15 NOTE — TELEPHONE ENCOUNTER
Caller: Phillip Bridges    Relationship: Self    Best call back number: [phone]    What is the best time to reach you: ANYTIME     Who are you requesting to speak with (clinical staff, provider,  specific staff member): DR FINCH / CLINICAL    What was the call regarding: PT IS SCHEDULED WITH DR NAVARRO ON 6-4, HE SEEN HIM A YEAR AGO AND STATED THAT HE WOULDN'T NEED TO COME BACK, HE WANTS TO ASK DR FINCH IF HE SHOULD KEEP THAT APPT OR CAN HE CANCEL IT.     PLEASE ADVISE

## 2025-06-26 ENCOUNTER — LAB (OUTPATIENT)
Dept: LAB | Facility: HOSPITAL | Age: 85
End: 2025-06-26
Payer: MEDICARE

## 2025-06-26 DIAGNOSIS — C61 PROSTATE CANCER: ICD-10-CM

## 2025-06-26 LAB
ALBUMIN SERPL-MCNC: 4.3 G/DL (ref 3.5–5.2)
ALBUMIN/GLOB SERPL: 2 G/DL
ALP SERPL-CCNC: 72 U/L (ref 39–117)
ALT SERPL W P-5'-P-CCNC: 22 U/L (ref 1–41)
ANION GAP SERPL CALCULATED.3IONS-SCNC: 14.8 MMOL/L (ref 5–15)
AST SERPL-CCNC: 23 U/L (ref 1–40)
BASOPHILS # BLD AUTO: 0.04 10*3/MM3 (ref 0–0.2)
BASOPHILS NFR BLD AUTO: 0.5 % (ref 0–1.5)
BILIRUB SERPL-MCNC: 0.2 MG/DL (ref 0–1.2)
BUN SERPL-MCNC: 19.7 MG/DL (ref 8–23)
BUN/CREAT SERPL: 20.5 (ref 7–25)
CALCIUM SPEC-SCNC: 8.9 MG/DL (ref 8.6–10.5)
CHLORIDE SERPL-SCNC: 109 MMOL/L (ref 98–107)
CO2 SERPL-SCNC: 21.2 MMOL/L (ref 22–29)
CREAT SERPL-MCNC: 0.96 MG/DL (ref 0.76–1.27)
DEPRECATED RDW RBC AUTO: 44.4 FL (ref 37–54)
EGFRCR SERPLBLD CKD-EPI 2021: 77.9 ML/MIN/1.73
EOSINOPHIL # BLD AUTO: 0.26 10*3/MM3 (ref 0–0.4)
EOSINOPHIL NFR BLD AUTO: 3 % (ref 0.3–6.2)
ERYTHROCYTE [DISTWIDTH] IN BLOOD BY AUTOMATED COUNT: 13 % (ref 12.3–15.4)
FERRITIN SERPL-MCNC: 360 NG/ML (ref 30–400)
GLOBULIN UR ELPH-MCNC: 2.2 GM/DL
GLUCOSE SERPL-MCNC: 180 MG/DL (ref 65–99)
HCT VFR BLD AUTO: 37.1 % (ref 37.5–51)
HGB BLD-MCNC: 12.3 G/DL (ref 13–17.7)
IMM GRANULOCYTES # BLD AUTO: 0.06 10*3/MM3 (ref 0–0.05)
IMM GRANULOCYTES NFR BLD AUTO: 0.7 % (ref 0–0.5)
IRON 24H UR-MRATE: 56 MCG/DL (ref 59–158)
IRON SATN MFR SERPL: 19 % (ref 20–50)
LYMPHOCYTES # BLD AUTO: 1.58 10*3/MM3 (ref 0.7–3.1)
LYMPHOCYTES NFR BLD AUTO: 18.2 % (ref 19.6–45.3)
MCH RBC QN AUTO: 30.9 PG (ref 26.6–33)
MCHC RBC AUTO-ENTMCNC: 33.2 G/DL (ref 31.5–35.7)
MCV RBC AUTO: 93.2 FL (ref 79–97)
MONOCYTES # BLD AUTO: 0.49 10*3/MM3 (ref 0.1–0.9)
MONOCYTES NFR BLD AUTO: 5.6 % (ref 5–12)
NEUTROPHILS NFR BLD AUTO: 6.26 10*3/MM3 (ref 1.7–7)
NEUTROPHILS NFR BLD AUTO: 72 % (ref 42.7–76)
NRBC BLD AUTO-RTO: 0 /100 WBC (ref 0–0.2)
PLATELET # BLD AUTO: 252 10*3/MM3 (ref 140–450)
PMV BLD AUTO: 10.7 FL (ref 6–12)
POTASSIUM SERPL-SCNC: 4.1 MMOL/L (ref 3.5–5.2)
PROT SERPL-MCNC: 6.5 G/DL (ref 6–8.5)
RBC # BLD AUTO: 3.98 10*6/MM3 (ref 4.14–5.8)
SODIUM SERPL-SCNC: 145 MMOL/L (ref 136–145)
TIBC SERPL-MCNC: 298 MCG/DL (ref 298–536)
TRANSFERRIN SERPL-MCNC: 200 MG/DL (ref 200–360)
WBC NRBC COR # BLD AUTO: 8.69 10*3/MM3 (ref 3.4–10.8)

## 2025-06-26 PROCEDURE — 85025 COMPLETE CBC W/AUTO DIFF WBC: CPT

## 2025-06-26 PROCEDURE — 80053 COMPREHEN METABOLIC PANEL: CPT

## 2025-06-26 PROCEDURE — 84403 ASSAY OF TOTAL TESTOSTERONE: CPT

## 2025-06-26 PROCEDURE — 84466 ASSAY OF TRANSFERRIN: CPT

## 2025-06-26 PROCEDURE — 36415 COLL VENOUS BLD VENIPUNCTURE: CPT

## 2025-06-26 PROCEDURE — 84153 ASSAY OF PSA TOTAL: CPT

## 2025-06-26 PROCEDURE — 84402 ASSAY OF FREE TESTOSTERONE: CPT

## 2025-06-26 PROCEDURE — 82728 ASSAY OF FERRITIN: CPT

## 2025-06-26 PROCEDURE — 83540 ASSAY OF IRON: CPT

## 2025-06-27 LAB — PSA SERPL-MCNC: 1.63 NG/ML (ref 0–4)

## 2025-06-29 LAB
TESTOST FREE SERPL-MCNC: 0.9 PG/ML (ref 6.6–18.1)
TESTOST SERPL-MCNC: 124 NG/DL (ref 264–916)

## 2025-07-01 ENCOUNTER — SPECIALTY PHARMACY (OUTPATIENT)
Dept: ONCOLOGY | Facility: HOSPITAL | Age: 85
End: 2025-07-01
Payer: MEDICARE

## 2025-07-01 ENCOUNTER — OFFICE VISIT (OUTPATIENT)
Dept: ONCOLOGY | Facility: CLINIC | Age: 85
End: 2025-07-01
Payer: MEDICARE

## 2025-07-01 VITALS
TEMPERATURE: 96.9 F | RESPIRATION RATE: 18 BRPM | HEART RATE: 76 BPM | HEIGHT: 71 IN | SYSTOLIC BLOOD PRESSURE: 168 MMHG | OXYGEN SATURATION: 98 % | BODY MASS INDEX: 27.75 KG/M2 | DIASTOLIC BLOOD PRESSURE: 88 MMHG | WEIGHT: 198.2 LBS

## 2025-07-01 DIAGNOSIS — C61 PROSTATE CANCER: ICD-10-CM

## 2025-07-01 DIAGNOSIS — C61 PROSTATE CANCER: Primary | ICD-10-CM

## 2025-07-01 PROCEDURE — 99214 OFFICE O/P EST MOD 30 MIN: CPT | Performed by: INTERNAL MEDICINE

## 2025-07-01 PROCEDURE — 1126F AMNT PAIN NOTED NONE PRSNT: CPT | Performed by: INTERNAL MEDICINE

## 2025-07-01 NOTE — PROGRESS NOTES
Follow Up Office Visit      Date: 2025     Patient Name: Phillip Bridges  MRN: [id number]  : 1940  Referring Physician: Los Scales     Chief Complaint: Follow-up for castrate sensitive prostate cancer     History of Present Illness: Phillip Bridges is a pleasant 82 y.o. male past medical history of seasonal allergies, hypothyroidism, hypertension, prostate cancer who presents today for evaluation of prostate cancer. The patient is accompanied by their wife who contributes to the history of their care.  Patient was initially diagnosed in  and underwent definitive radiation therapy.  Pathology results not available to me from his initial diagnosis.  He was on observation with normal PSAs until  when his PSA started to slowly creep up over the past 6 years until eventually reaching a range of 5.48 in 2022.  He was started on Eligard as well as Casodex.  Had significant side effects with Eligard in terms of fatigue, hot flashes, swelling, emotional changes.  He was seen by Dr. Scales who ordered a PSA and a PSMA PET/CT.  PSA in May 2023 was <0.014 and his PSMA PET/CT did not have any disease noted.  He is currently doing well with no major toxicities.     Interval History:  Presents to clinic for follow-up.  Started on relugolix at the end of 2025.  Treatment discontinued in 2025 due to significant fatigue and tiredness.  Overall stable today.  Still has some intermittent mood swings.  Notes an upper respiratory infection that has been present for the past few weeks    Oncology History:    Oncology/Hematology History   Prostate cancer   5/15/2023 Initial Diagnosis    Prostate cancer     2025 -  Chemotherapy    OP SUPPORTIVE PROSTATE Relugolix         Subjective      Review of Systems:   Constitutional: Negative for fevers, chills, or weight loss  Eyes: Negative for blurred vision or discharge         Ear/Nose/Throat: Negative for difficulty swallowing, sore throat, LAD                                                        Respiratory: Negative for cough, SOA, wheezing                                                                                        Cardiovascular: Negative for chest pain or palpitations                                                                  Gastrointestinal: Negative for nausea, vomiting or diarrhea                                                                     Genitourinary: Negative for dysuria or hematuria                                                                                           Musculoskeletal: Negative for any joint pains or muscle aches                                                                        Neurologic: Negative for any weakness, headaches, dizziness                                                                         Hematologic: Negative for any easy bleeding or bruising                                                                                   Psychiatric: Negative for anxiety or depression                          Past Medical History/Past Surgical History/ Family History/ Social History: Reviewed by me and unchanged from my previous documentation done on April 2025.     Medications:     Current Outpatient Medications:     aspirin 81 MG EC tablet, Take 1 tablet by mouth Daily., Disp: , Rfl:     clindamycin 1 % gel, Apply 1 Application topically to the appropriate area as directed 2 (Two) Times a Day., Disp: , Rfl:     clobetasol (CLOBEX) 0.05 % lotion, Apply 1 Application topically to the appropriate area as directed 2 (Two) Times a Day., Disp: , Rfl:     eszopiclone (LUNESTA) 1 MG tablet, As Needed., Disp: , Rfl:     ketoconazole (NIZORAL) 2 % cream, Apply 1 Application topically to the appropriate area as directed Daily., Disp: , Rfl:     levothyroxine (SYNTHROID, LEVOTHROID) 75 MCG tablet, , Disp: , Rfl:     lidocaine (LIDODERM) 5 %, Place 1 patch on the skin as directed by provider Daily.,  "Disp: 30 patch, Rfl: 0    olmesartan (BENICAR) 5 MG tablet, Take 1 tablet by mouth Daily., Disp: , Rfl:     Olmesartan Medoxomil (BENICAR PO), Take 10 mg by mouth Daily., Disp: , Rfl:     relugolix (ORGOVYX) 120 MG tablet tablet, Take 3 tablets by mouth on Day 1 of Cycle 1 only, then take 1 tablet by mouth daily thereafter., Disp: 30 tablet, Rfl: 11    sertraline (ZOLOFT) 50 MG tablet, Take 1 tablet by mouth Daily., Disp: , Rfl:     fexofenadine (ALLEGRA) 60 MG tablet, Take 1 tablet by mouth Daily. (Patient not taking: Reported on 7/1/2025), Disp: , Rfl:     Allergies:   No Known Allergies    Objective     Physical Exam:  Vital Signs:   Vitals:    07/01/25 0944   BP: 168/88   Pulse: 76   Resp: 18   Temp: 96.9 °F (36.1 °C)   TempSrc: Temporal   SpO2: 98%   Weight: 89.9 kg (198 lb 3.2 oz)   Height: 180.3 cm (70.98\")   PainSc: 0-No pain     Pain Score    07/01/25 0944   PainSc: 0-No pain     ECOG Performance Status: 1 - Symptomatic but completely ambulatory    Constitutional: NAD, ECOG 1  Eyes: PERRLA, scleral anicteric  ENT: No LAD, no thyromegaly  Respiratory: CTAB, no wheezing, rales, rhonchi  Cardiovascular: RRR, no murmurs, pulses 2+ bilaterally  Abdomen: soft, NT/ND, no HSM  Musculoskeletal: strength 5/5 bilaterally, no c/c/e  Neurologic: A&O x 3, CN II-XII intact grossly    Results Review:   Lab on 06/26/2025   Component Date Value Ref Range Status    Testosterone, Total 06/26/2025 124 (L)  264 - 916 ng/dL Final    Adult male reference interval is based on a population of  healthy nonobese males (BMI <30) between 19 and 39 years old.  aime Lee.al. JCEM 2017,102;9018-1008. PMID: 13879625.    Testosterone, Free 06/26/2025 0.9 (L)  6.6 - 18.1 pg/mL Final    Iron 06/26/2025 56 (L)  59 - 158 mcg/dL Final    Iron Saturation (TSAT) 06/26/2025 19 (L)  20 - 50 % Final    Transferrin 06/26/2025 200  200 - 360 mg/dL Final    TIBC 06/26/2025 298  298 - 536 mcg/dL Final    Ferritin 06/26/2025 360.00  30.00 - 400.00 ng/mL " Final    Glucose 06/26/2025 180 (H)  65 - 99 mg/dL Final    BUN 06/26/2025 19.7  8.0 - 23.0 mg/dL Final    Creatinine 06/26/2025 0.96  0.76 - 1.27 mg/dL Final    Sodium 06/26/2025 145  136 - 145 mmol/L Final    Potassium 06/26/2025 4.1  3.5 - 5.2 mmol/L Final    Specimen hemolyzed.  Result may be falsely elevated.    Chloride 06/26/2025 109 (H)  98 - 107 mmol/L Final    CO2 06/26/2025 21.2 (L)  22.0 - 29.0 mmol/L Final    Calcium 06/26/2025 8.9  8.6 - 10.5 mg/dL Final    Total Protein 06/26/2025 6.5  6.0 - 8.5 g/dL Final    Albumin 06/26/2025 4.3  3.5 - 5.2 g/dL Final    ALT (SGPT) 06/26/2025 22  1 - 41 U/L Final    AST (SGOT) 06/26/2025 23  1 - 40 U/L Final    Alkaline Phosphatase 06/26/2025 72  39 - 117 U/L Final    Total Bilirubin 06/26/2025 0.2  0.0 - 1.2 mg/dL Final    Globulin 06/26/2025 2.2  gm/dL Final    Calculated Result    A/G Ratio 06/26/2025 2.0  g/dL Final    BUN/Creatinine Ratio 06/26/2025 20.5  7.0 - 25.0 Final    Anion Gap 06/26/2025 14.8  5.0 - 15.0 mmol/L Final    eGFR 06/26/2025 77.9  >60.0 mL/min/1.73 Final    WBC 06/26/2025 8.69  3.40 - 10.80 10*3/mm3 Final    RBC 06/26/2025 3.98 (L)  4.14 - 5.80 10*6/mm3 Final    Hemoglobin 06/26/2025 12.3 (L)  13.0 - 17.7 g/dL Final    Hematocrit 06/26/2025 37.1 (L)  37.5 - 51.0 % Final    MCV 06/26/2025 93.2  79.0 - 97.0 fL Final    MCH 06/26/2025 30.9  26.6 - 33.0 pg Final    MCHC 06/26/2025 33.2  31.5 - 35.7 g/dL Final    RDW 06/26/2025 13.0  12.3 - 15.4 % Final    RDW-SD 06/26/2025 44.4  37.0 - 54.0 fl Final    MPV 06/26/2025 10.7  6.0 - 12.0 fL Final    Platelets 06/26/2025 252  140 - 450 10*3/mm3 Final    Neutrophil % 06/26/2025 72.0  42.7 - 76.0 % Final    Lymphocyte % 06/26/2025 18.2 (L)  19.6 - 45.3 % Final    Monocyte % 06/26/2025 5.6  5.0 - 12.0 % Final    Eosinophil % 06/26/2025 3.0  0.3 - 6.2 % Final    Basophil % 06/26/2025 0.5  0.0 - 1.5 % Final    Immature Grans % 06/26/2025 0.7 (H)  0.0 - 0.5 % Final    Neutrophils, Absolute 06/26/2025 6.26   1.70 - 7.00 10*3/mm3 Final    Lymphocytes, Absolute 06/26/2025 1.58  0.70 - 3.10 10*3/mm3 Final    Monocytes, Absolute 06/26/2025 0.49  0.10 - 0.90 10*3/mm3 Final    Eosinophils, Absolute 06/26/2025 0.26  0.00 - 0.40 10*3/mm3 Final    Basophils, Absolute 06/26/2025 0.04  0.00 - 0.20 10*3/mm3 Final    Immature Grans, Absolute 06/26/2025 0.06 (H)  0.00 - 0.05 10*3/mm3 Final    nRBC 06/26/2025 0.0  0.0 - 0.2 /100 WBC Final    PSA 06/26/2025 1.630  0.000 - 4.000 ng/mL Final       No results found.    Assessment / Plan      Assessment/Plan:   1. Prostate cancer (Primary)  -Initially diagnosed in 2008 and status post definitive radiation treatment  -PSA slowly rising from 2007 from 0.35 until October 20 22-5.48  -Castrate sensitive disease and likely low risk disease based off the amount of time for his PSA to increase  -Started on Eligard and Casodex in October 2022 with significant toxicities related to the Eligard  -PSA in May 2023 <0.014 and PSMA PET/CT with no disease noted  -PSA in August 2023 < 0.014  -PSA in November 2023 <0.014  -PSA in February 2024 <0.014  -PSA in May 2024 0.077  -PSA in August 2024 0.522  -PSMA PET/CT in September 2024 without evidence of metastatic disease  -PSA 1.9 in November 2024  -PSMA PET/CT in December 2024 notable for elevated uptake in the periphery of the right prostate gland  -Was seen by Dr. Tran with radiation oncology and deemed to be high risk given his previous radiation therapy in the area  -Started on relugolix in January 2025.     -PSA decreasing to 0.411 in February 2025.    -PSA 0.137 in March 2025.  Patient elected to hold relugolix due to toxicity.  Per patient preference, we will continue to hold relugolix  -PSA 1.6 in June 2025  -Plan for repeat labs in 3 months.  Ordered today     2.  Pituitary macroadenoma  -Follows with neurosurgery at   -Most recent MRI in June 2023 showing stable disease with plans to repeat in 1 year         Follow Up:   Follow-up in 3  months     Stanford Fonseca MD  Hematology and Oncology     Please note that portions of this note may have been completed with a voice recognition program. Efforts were made to edit the dictations, but occasionally words are mistranscribed.